# Patient Record
Sex: MALE | Race: ASIAN | NOT HISPANIC OR LATINO
[De-identification: names, ages, dates, MRNs, and addresses within clinical notes are randomized per-mention and may not be internally consistent; named-entity substitution may affect disease eponyms.]

---

## 2021-01-04 ENCOUNTER — APPOINTMENT (OUTPATIENT)
Dept: UROLOGY | Facility: CLINIC | Age: 66
End: 2021-01-04
Payer: COMMERCIAL

## 2021-01-04 VITALS
HEART RATE: 96 BPM | BODY MASS INDEX: 29.57 KG/M2 | SYSTOLIC BLOOD PRESSURE: 157 MMHG | WEIGHT: 184 LBS | DIASTOLIC BLOOD PRESSURE: 82 MMHG | HEIGHT: 66 IN | TEMPERATURE: 98.6 F

## 2021-01-04 DIAGNOSIS — Z78.9 OTHER SPECIFIED HEALTH STATUS: ICD-10-CM

## 2021-01-04 PROCEDURE — 99072 ADDL SUPL MATRL&STAF TM PHE: CPT

## 2021-01-04 PROCEDURE — 99204 OFFICE O/P NEW MOD 45 MIN: CPT | Mod: 25

## 2021-01-04 PROCEDURE — 51798 US URINE CAPACITY MEASURE: CPT

## 2021-01-04 RX ORDER — LOSARTAN POTASSIUM 100 MG/1
TABLET, FILM COATED ORAL
Refills: 0 | Status: ACTIVE | COMMUNITY

## 2021-01-04 RX ORDER — AMLODIPINE BESYLATE 5 MG/1
TABLET ORAL
Refills: 0 | Status: ACTIVE | COMMUNITY

## 2021-01-04 RX ORDER — FINASTERIDE 5 MG/1
5 TABLET, FILM COATED ORAL DAILY
Qty: 90 | Refills: 3 | Status: ACTIVE | COMMUNITY
Start: 2021-01-04 | End: 1900-01-01

## 2021-01-04 NOTE — ADDENDUM
[FreeTextEntry1] : Entered by Karthikeyan Emmanuel, acting as scribe for Dr. Fernando Muhammad.\par \par The documentation recorded by the scribe accurately reflects the service I personally performed and the decisions made by me.\par

## 2021-01-04 NOTE — LETTER BODY
[FreeTextEntry1] : Gavin Ayala MD\par [address] [facility],\[place] [address]\par [phone]\par \par Dear Dr. Ayala,\par \par Reason for Visit: BPH. Nocturnal enuresis. Stress incontinence.\par \par This is a 65 year-old gentleman with symptoms of BPH. Patient is here today for evaluation. Patient reports he has weak uroflow, frequency, and hesitancy. He denies any hematuria. He also complains of nocturnal enuresis and stress incontinence. His symptoms are aggravated by hydration. He denies any alleviating factors. He has not tried any medical therapy previously. He reports no pain. All other review of systems are negative. He has no cancer in his family medical history. He has no previous surgical history. Past medical history, family history and social history were inquired and were noncontributory to current condition. The patient does not use tobacco or drink alcohol. Medications and allergies were reviewed. He has no known allergies to medication. \par \par On examination, the patient is a healthy-appearing gentleman in no acute distress. He is alert and oriented follows commands. He has normal mood and affect. He is normocephalic. Neck is supple. Oral no thrush Respirations are unlabored. His abdomen is soft and nontender. Bladder is nonpalpable. No CVA tenderness. Neurologically he is grossly intact. No peripheral edema. Skin without gross abnormality. He has normal male external genitalia. Normal meatus. Bilateral testes are descended intrascrotally and normal to palpation. On rectal examination, there is normal sphincter tone. The prostate is clinically benign without focal induration or nodularity.\par \par Post-void residual on bladder scan today was 750 cc.\par \par ASSESSMENT: BPH. Nocturnal enuresis. Stress incontinence. Urinary retention.\par \par I counseled the patient on the various etiology of his symptoms. I discussed the natural history of BPH and the treatment options available. I discussed the options of conservative management with fluid in dietary restrictions, herbal therapy, medical therapy, and minimally invasive procedures. Risk and benefits were discussed. I answered his questions. I recommended he begin a trial of Flomax and Proscar. I discussed the potential side effects of the medications. I counseled the patient on its use and side effects. If the patient develops any side effects, the patient will discontinue the medications and contact me. He will obtain BMP, PSA and urinalysis today for further evaluation. His PVR today was 750 cc. Given his urinary retention, I recommended the patient undergo Madden catheter placement today. He declines catheter placement. I also recommended he obtain renal ultrasound for further evaluation. Risks and alternatives were discussed. I answered the patient questions. The patient will follow-up in 1 week and will contact me with any questions or concerns. Thank you for the opportunity to participate in the care of Mr. LENZ. I will keep you updated on his progress.\par \par Plan: Trial of Flomax and Proscar. Renal ultrasound. BMP. PSA. Urinalysis. Follow-up in 1 week.

## 2021-01-10 LAB
ANION GAP SERPL CALC-SCNC: 12 MMOL/L
BUN SERPL-MCNC: 13 MG/DL
CALCIUM SERPL-MCNC: 9.5 MG/DL
CHLORIDE SERPL-SCNC: 101 MMOL/L
CO2 SERPL-SCNC: 27 MMOL/L
CREAT SERPL-MCNC: 1 MG/DL
GLUCOSE SERPL-MCNC: 98 MG/DL
POTASSIUM SERPL-SCNC: 3.7 MMOL/L
PSA FREE FLD-MCNC: 22 %
PSA FREE SERPL-MCNC: 1.39 NG/ML
PSA SERPL-MCNC: 6.33 NG/ML
SODIUM SERPL-SCNC: 139 MMOL/L

## 2021-01-14 ENCOUNTER — OUTPATIENT (OUTPATIENT)
Dept: OUTPATIENT SERVICES | Facility: HOSPITAL | Age: 66
LOS: 1 days | End: 2021-01-14
Payer: COMMERCIAL

## 2021-01-14 ENCOUNTER — APPOINTMENT (OUTPATIENT)
Dept: UROLOGY | Facility: CLINIC | Age: 66
End: 2021-01-14
Payer: COMMERCIAL

## 2021-01-14 DIAGNOSIS — Z00.00 ENCOUNTER FOR GENERAL ADULT MEDICAL EXAMINATION W/OUT ABNORMAL FINDINGS: ICD-10-CM

## 2021-01-14 DIAGNOSIS — R35.0 FREQUENCY OF MICTURITION: ICD-10-CM

## 2021-01-14 PROCEDURE — 76775 US EXAM ABDO BACK WALL LIM: CPT | Mod: 26

## 2021-01-14 PROCEDURE — 99072 ADDL SUPL MATRL&STAF TM PHE: CPT

## 2021-01-14 PROCEDURE — 51798 US URINE CAPACITY MEASURE: CPT

## 2021-01-14 PROCEDURE — 76775 US EXAM ABDO BACK WALL LIM: CPT

## 2021-01-14 PROCEDURE — 99214 OFFICE O/P EST MOD 30 MIN: CPT | Mod: 25

## 2021-01-14 RX ORDER — ENEMA 19; 7 G/133ML; G/133ML
7-19 ENEMA RECTAL
Qty: 2 | Refills: 0 | Status: ACTIVE | COMMUNITY
Start: 2021-01-14 | End: 1900-01-01

## 2021-01-14 NOTE — LETTER BODY
[FreeTextEntry1] : Gavin Ayala MD\par [address] [facility],\[place] [address]\par [phone]\par \par Dear Dr. Ayala,\par \par Reason for Visit: BPH. Nocturnal enuresis. Stress incontinence. Urinary retention. Elevated PSA.\par \par This is a 65 year-old gentleman with symptoms of BPH. The patient returns today for follow-up renal ultrasound. Since his last visit, the patient reports taking Flomax BID and Proscar regularly without any side effects or difficulties with the medications. He reports of improved urinary symptoms on medical therapy. He denies any hematuria or pain. All other review of systems are negative. Past medical history, family history and social history were unchanged. Medications and allergies were reviewed. He has no known allergies to medication. \par \par On examination, the patient is a healthy-appearing gentleman in no acute distress. He is alert and oriented follows commands. He has normal mood and affect. He is normocephalic. Neck is supple. Oral no thrush Respirations are unlabored. His abdomen is soft and nontender. Bladder is nonpalpable. No CVA tenderness. Neurologically he is grossly intact. No peripheral edema. Skin without gross abnormality. He has normal male external genitalia. Normal meatus. Bilateral testes are descended intrascrotally and normal to palpation. On rectal examination, there is normal sphincter tone. The prostate is clinically benign without focal induration or nodularity.\par \par His BMP demonstrated normal renal functions, creatinine 1. His PSA was 6.33, which is elevated.\par \par I personally reviewed ultrasound images with the patient today and images demonstrated a 0.6 x 0.4 cm mid pole cortical simple cyst seen in the left kidney with no vascularity. The right kidney appears unremarkable. Both kidneys are normal in size and echogenicity without stones, hydronephrosis or solid masses visualized\par \par Post-void residual on bladder scan today was 400 cc, which is improved. On bladder scan today, a large median lobe versus bladder lesion was also visualized. His previous PVR was 750 cc.\par \par ASSESSMENT: BPH, improved on Flomax and Proscar. Nocturnal enuresis. Stress incontinence. Urinary retention, improved. Elevated PSA. Abnormal bladder scan.\par \par I counseled the patient. In terms of his urinary retention, his PVR today was 400 cc, which is improved. Given his abnormal bladder scan, I recommended the patient undergo cystoscopy to evaluate for bladder lesion versus large median lobe. In terms of his BPH, he has improved symptoms on medical therapy. I recommended he continue taking Flomax and Proscar regularly as directed. In terms of his elevated PSA, I recommended the patient undergo prostate MRI. I counseled the patient regarding the procedure. The risks and benefits were discussed. Alternatives were given. I answered the patient questions. The patient will take the necessary preparations for the procedure, including fleet enema. I recommended he repeat BMP and PSA today to ensure stability. He will also obtain urinalysis today. Risks and alternatives were discussed. I answered the patient questions. The patient will follow-up as directed and will contact me with any questions or concerns. Thank you for the opportunity to participate in the care of Ms. LENZ. I will keep you updated on her progress.\par \par Plan: Continue Flomax and Proscar. Cystoscopy. Prostate MRI. Fleet enema. BMP. PSA. Urinalysis. Follow-up as directed.

## 2021-01-15 DIAGNOSIS — N39.3 STRESS INCONTINENCE (FEMALE) (MALE): ICD-10-CM

## 2021-01-15 DIAGNOSIS — R33.9 RETENTION OF URINE, UNSPECIFIED: ICD-10-CM

## 2021-02-11 ENCOUNTER — OUTPATIENT (OUTPATIENT)
Dept: OUTPATIENT SERVICES | Facility: HOSPITAL | Age: 66
LOS: 1 days | End: 2021-02-11
Payer: COMMERCIAL

## 2021-02-11 ENCOUNTER — APPOINTMENT (OUTPATIENT)
Dept: UROLOGY | Facility: CLINIC | Age: 66
End: 2021-02-11
Payer: COMMERCIAL

## 2021-02-11 VITALS
DIASTOLIC BLOOD PRESSURE: 104 MMHG | RESPIRATION RATE: 16 BRPM | HEART RATE: 78 BPM | SYSTOLIC BLOOD PRESSURE: 158 MMHG | TEMPERATURE: 97.9 F

## 2021-02-11 DIAGNOSIS — R35.0 FREQUENCY OF MICTURITION: ICD-10-CM

## 2021-02-11 DIAGNOSIS — N39.44 NOCTURNAL ENURESIS: ICD-10-CM

## 2021-02-11 PROCEDURE — 88112 CYTOPATH CELL ENHANCE TECH: CPT | Mod: 26

## 2021-02-11 PROCEDURE — 52000 CYSTOURETHROSCOPY: CPT

## 2021-02-11 PROCEDURE — 99213 OFFICE O/P EST LOW 20 MIN: CPT | Mod: 25

## 2021-02-11 PROCEDURE — 99072 ADDL SUPL MATRL&STAF TM PHE: CPT

## 2021-02-11 RX ORDER — TAMSULOSIN HYDROCHLORIDE 0.4 MG/1
0.4 CAPSULE ORAL
Qty: 180 | Refills: 3 | Status: ACTIVE | COMMUNITY
Start: 2021-01-04 | End: 1900-01-01

## 2021-02-11 NOTE — LETTER BODY
[FreeTextEntry1] : Gavin Ayala MD\par [address] [facility],\[place] [address]\par [phone]\par \par Dear Dr. Ayala,\par \par Reason for Visit: BPH. Nocturnal enuresis. Stress incontinence. Urinary retention. Elevated PSA.\par \par This is a 65 year-old gentleman with symptoms of BPH. His renal ultrasound in January demonstrated a simple left renal cyst, but was otherwise unremarkable. He returns today for follow-up and cystoscopy. Since he was last seen, the patient reports taking Flomax QD and Proscar regularly without any side effects or difficulties. He reports of stable urinary symptoms on medical therapy. He denies any hematuria or pain. He denies any changes in health. All other review of systems are negative. Past medical history, family history and social history were unchanged. Medications and allergies were reviewed. He has no known allergies to medication. \par \par On examination, the patient is a healthy-appearing gentleman in no acute distress. He is alert and oriented follows commands. He has normal mood and affect. He is normocephalic. Neck is supple. Oral no thrush Respirations are unlabored. His abdomen is soft and nontender. Bladder is nonpalpable. No CVA tenderness. Neurologically he is grossly intact. No peripheral edema. Skin without gross abnormality. He has normal male external genitalia. Normal meatus. Bilateral testes are descended intrascrotally and normal to palpation. On rectal examination, there is normal sphincter tone. The prostate is clinically benign without focal induration or nodularity.\par \par His cystoscopy today demonstrated a 6 cm prostatic urethra and trilobar hypertrophy obstructing the urinary outlet.\par \par ASSESSMENT: BPH, stable on Flomax and Proscar. Nocturnal enuresis. Stress incontinence. Urinary retention, improved. Elevated PSA. Abnormal bladder scan.\par \par I counseled the patient. His cystoscopy today demonstrated trilobar hypertrophy obstructing the urinary outlet. I recommended the patient consider GreenLight laser vaporization of prostate. The patient declined surgery currently. He wishes to proceed with medical therapy. I recommended the patient increase taking Flomax to BID and continue taking Proscar. I renewed the patient's prescription for Flomax BID today. I encouraged the patient to continue medications regularly as directed. In terms of his elevated PSA, I recommended he undergo prostate MRI. I counseled the patient regarding the procedure. The risks and benefits were discussed. Alternatives were given. I answered the patient questions. The patient will take the necessary preparations for the procedure, including fleet enema. Risks and alternatives were discussed. I answered the patient questions. The patient will follow-up in 2 weeks and will contact me with any questions or concerns. Thank you for the opportunity to participate in the care of Ms. LENZ. I will keep you updated on her progress.\par \par Plan: Continue Proscar. Increase Flomax to BID. Prostate MRI. Fleet enema. Follow-up in 2 weeks.

## 2021-02-14 ENCOUNTER — APPOINTMENT (OUTPATIENT)
Dept: MRI IMAGING | Facility: IMAGING CENTER | Age: 66
End: 2021-02-14
Payer: COMMERCIAL

## 2021-02-14 ENCOUNTER — OUTPATIENT (OUTPATIENT)
Dept: OUTPATIENT SERVICES | Facility: HOSPITAL | Age: 66
LOS: 1 days | End: 2021-02-14
Payer: COMMERCIAL

## 2021-02-14 ENCOUNTER — RESULT REVIEW (OUTPATIENT)
Age: 66
End: 2021-02-14

## 2021-02-14 DIAGNOSIS — R33.9 RETENTION OF URINE, UNSPECIFIED: ICD-10-CM

## 2021-02-14 DIAGNOSIS — N39.3 STRESS INCONTINENCE (FEMALE) (MALE): ICD-10-CM

## 2021-02-14 DIAGNOSIS — R97.20 ELEVATED PROSTATE SPECIFIC ANTIGEN [PSA]: ICD-10-CM

## 2021-02-14 DIAGNOSIS — Z00.8 ENCOUNTER FOR OTHER GENERAL EXAMINATION: ICD-10-CM

## 2021-02-14 PROCEDURE — 76377 3D RENDER W/INTRP POSTPROCES: CPT | Mod: 26

## 2021-02-14 PROCEDURE — 72197 MRI PELVIS W/O & W/DYE: CPT

## 2021-02-14 PROCEDURE — 72197 MRI PELVIS W/O & W/DYE: CPT | Mod: 26

## 2021-02-14 PROCEDURE — A9585: CPT

## 2021-02-14 PROCEDURE — 76377 3D RENDER W/INTRP POSTPROCES: CPT

## 2021-02-15 LAB — URINE CYTOLOGY: NORMAL

## 2021-02-17 DIAGNOSIS — N39.3 STRESS INCONTINENCE (FEMALE) (MALE): ICD-10-CM

## 2021-02-17 DIAGNOSIS — N39.44 NOCTURNAL ENURESIS: ICD-10-CM

## 2021-03-01 ENCOUNTER — APPOINTMENT (OUTPATIENT)
Dept: UROLOGY | Facility: CLINIC | Age: 66
End: 2021-03-01
Payer: COMMERCIAL

## 2021-03-01 PROCEDURE — 99072 ADDL SUPL MATRL&STAF TM PHE: CPT

## 2021-03-01 PROCEDURE — 99214 OFFICE O/P EST MOD 30 MIN: CPT | Mod: 25

## 2021-03-01 PROCEDURE — 51798 US URINE CAPACITY MEASURE: CPT

## 2021-03-01 NOTE — LETTER BODY
[FreeTextEntry1] : Gavin Ayala MD\par [address] [facility],\[place] [address]\par [phone]\par \par Dear Dr. Ayala,\par \par Reason for Visit: BPH. Nocturnal enuresis. Stress incontinence. Urinary retention. Elevated PSA.\par \par This is a 65 year-old gentleman with symptoms of BPH. His renal ultrasound in January 2020 demonstrated a simple left renal cyst, but was otherwise unremarkable. Patient's cystoscopy in February demonstrated a 6 cm prostatic urethra and trilobar hypertrophy obstructing the urinary outlet. The patient returns today for follow-up. Since his last visit, the patient obtained a prostate MRI in February, which demonstrated an enlarged prostate and an intermediate prostatic lesion, PI-RADS 3. Patient reports taking Flomax BID and Proscar regularly without any side effects or difficulties. He reports of stable urinary symptoms on medical therapy. He denies any hematuria or pain. He denies any changes in health. All other review of systems are negative. Past medical history, family history and social history were unchanged. Medications and allergies were reviewed. He has no known allergies to medication. \par \par On examination, the patient is a healthy-appearing gentleman in no acute distress. He is alert and oriented follows commands. He has normal mood and affect. He is normocephalic. Neck is supple. Oral no thrush Respirations are unlabored. His abdomen is soft and nontender. Bladder is nonpalpable. No CVA tenderness. Neurologically he is grossly intact. No peripheral edema. Skin without gross abnormality. He has normal male external genitalia. Normal meatus. Bilateral testes are descended intrascrotally and normal to palpation. On rectal examination, there is normal sphincter tone. The prostate is clinically benign without focal induration or nodularity.\par \par I personally reviewed MRI prostate images with the patient today and images demonstrated an enlarged prostate gland of 147 cc, and an intermediate prostatic lesion, PI-RADS 3.\par \par Post-void residual on bladder scan today was 700 cc.\par \par ASSESSMENT: BPH, stable on Flomax BID and Proscar. Nocturnal enuresis. Stress incontinence. Urinary retention, improved. Elevated PSA. Abnormal bladder scan. \par \par I counseled the patient. His PVR today was 700 cc. In terms of his BPH, his symptoms are stable on medical therapy. I recommended he continue taking Flomax BID and Proscar. In terms of his elevated PSA, his prostate MRI demonstrated an enlarged prostate and an intermediate prostatic lesion, PI-RADS 3. I recommended the patient consider undergoing either open or robotic assisted simple prostatectomy. I counseled the patient regarding the procedure. The risks and benefits were discussed. Alternatives were given. I answered the patient questions. The patient will consider the procedure. Risks and alternatives were discussed. I answered the patient questions. The patient will follow-up as directed and will contact me with any questions or concerns. Thank you for the opportunity to participate in the care of Mr. LENZ. I will keep you updated on his progress.\par \par Plan: Continue Flomax BID and Proscar. Consider surgery. Follow-up as directed.

## 2021-03-31 ENCOUNTER — OUTPATIENT (OUTPATIENT)
Dept: OUTPATIENT SERVICES | Facility: HOSPITAL | Age: 66
LOS: 1 days | End: 2021-03-31
Payer: COMMERCIAL

## 2021-03-31 VITALS
DIASTOLIC BLOOD PRESSURE: 90 MMHG | WEIGHT: 188.05 LBS | TEMPERATURE: 97 F | HEIGHT: 65.5 IN | SYSTOLIC BLOOD PRESSURE: 149 MMHG | RESPIRATION RATE: 15 BRPM | HEART RATE: 71 BPM | OXYGEN SATURATION: 97 %

## 2021-03-31 DIAGNOSIS — Z98.890 OTHER SPECIFIED POSTPROCEDURAL STATES: Chronic | ICD-10-CM

## 2021-03-31 DIAGNOSIS — N39.3 STRESS INCONTINENCE (FEMALE) (MALE): ICD-10-CM

## 2021-03-31 DIAGNOSIS — Z01.818 ENCOUNTER FOR OTHER PREPROCEDURAL EXAMINATION: ICD-10-CM

## 2021-03-31 LAB
A1C WITH ESTIMATED AVERAGE GLUCOSE RESULT: 5.5 % — SIGNIFICANT CHANGE UP (ref 4–5.6)
ANION GAP SERPL CALC-SCNC: 10 MMOL/L — SIGNIFICANT CHANGE UP (ref 5–17)
BUN SERPL-MCNC: 17 MG/DL — SIGNIFICANT CHANGE UP (ref 7–23)
CALCIUM SERPL-MCNC: 9.4 MG/DL — SIGNIFICANT CHANGE UP (ref 8.4–10.5)
CHLORIDE SERPL-SCNC: 100 MMOL/L — SIGNIFICANT CHANGE UP (ref 96–108)
CO2 SERPL-SCNC: 26 MMOL/L — SIGNIFICANT CHANGE UP (ref 22–31)
CREAT SERPL-MCNC: 0.91 MG/DL — SIGNIFICANT CHANGE UP (ref 0.5–1.3)
ESTIMATED AVERAGE GLUCOSE: 111 MG/DL — SIGNIFICANT CHANGE UP (ref 68–114)
GLUCOSE SERPL-MCNC: 98 MG/DL — SIGNIFICANT CHANGE UP (ref 70–99)
HCT VFR BLD CALC: 46.6 % — SIGNIFICANT CHANGE UP (ref 39–50)
HGB BLD-MCNC: 15 G/DL — SIGNIFICANT CHANGE UP (ref 13–17)
MCHC RBC-ENTMCNC: 29.5 PG — SIGNIFICANT CHANGE UP (ref 27–34)
MCHC RBC-ENTMCNC: 32.2 GM/DL — SIGNIFICANT CHANGE UP (ref 32–36)
MCV RBC AUTO: 91.7 FL — SIGNIFICANT CHANGE UP (ref 80–100)
NRBC # BLD: 0 /100 WBCS — SIGNIFICANT CHANGE UP (ref 0–0)
PLATELET # BLD AUTO: 259 K/UL — SIGNIFICANT CHANGE UP (ref 150–400)
POTASSIUM SERPL-MCNC: 4.3 MMOL/L — SIGNIFICANT CHANGE UP (ref 3.5–5.3)
POTASSIUM SERPL-SCNC: 4.3 MMOL/L — SIGNIFICANT CHANGE UP (ref 3.5–5.3)
RBC # BLD: 5.08 M/UL — SIGNIFICANT CHANGE UP (ref 4.2–5.8)
RBC # FLD: 12.5 % — SIGNIFICANT CHANGE UP (ref 10.3–14.5)
SODIUM SERPL-SCNC: 136 MMOL/L — SIGNIFICANT CHANGE UP (ref 135–145)
WBC # BLD: 7.52 K/UL — SIGNIFICANT CHANGE UP (ref 3.8–10.5)
WBC # FLD AUTO: 7.52 K/UL — SIGNIFICANT CHANGE UP (ref 3.8–10.5)

## 2021-03-31 PROCEDURE — G0463: CPT

## 2021-03-31 NOTE — H&P PST ADULT - HISTORY OF PRESENT ILLNESS
66 YO M PMH HTN, prediabetes, seasonal allergies, BPH, cystoscopy (2/2021) demonstrated 6cm prostatic urethra & trilobar hypertrophy obstructing the urinary outlet, MRI demonstrated enlarged prostate w/intermediate prostatic lesion, c/o urinary incontinence & frequency, presents to PST for SUPRAPUBIC PROSTATECTOMY 4/5/2021. Denies any hematuria, pain, palpitations, SOB, N/V, Covid symptoms (fever or chills) or exposure.    ***Covid test scheduled for 4/2/2021 at [facility].

## 2021-03-31 NOTE — H&P PST ADULT - ATTENDING COMMENTS
Patient with BPH with persistent urinary retention despite medcial therapy. Patient wishes and specifically requested open simple prostatectomy. Informed consent was obtained. Alternatives were given. Risks including but not limited to bleeding, need for blood transfusion, infection, risk of anesthesia, pain, failure to cure, incontinence, impotence, rectal injury, bladder neck contracture, need for future procedure, and injury to surrounding structures were discussed. Patient wishes to proceed with procedure. Patient with BPH with persistent urinary retention with PVR > 500 cc despite medical therapy. Patient wishes and specifically requested open simple prostatectomy. Informed consent was obtained. Alternatives were given. Risks including but not limited to bleeding, need for blood transfusion, infection, risk of anesthesia, pain, failure to cure, incontinence, impotence, rectal injury, bladder neck contracture, need for future procedure, and injury to surrounding structures were discussed. Patient wishes to proceed with procedure.

## 2021-03-31 NOTE — H&P PST ADULT - NSICDXPASTMEDICALHX_GEN_ALL_CORE_FT
PAST MEDICAL HISTORY:  Gout     H/O urinary incontinence     H/O: HTN (hypertension)     History of prediabetes     Seasonal allergies      PAST MEDICAL HISTORY:  Gout     H/O urinary incontinence     H/O: HTN (hypertension)     History of BPH     History of prediabetes     Seasonal allergies

## 2021-03-31 NOTE — H&P PST ADULT - NSICDXPROBLEM_GEN_ALL_CORE_FT
PROBLEM DIAGNOSES  Problem: Stress incontinence (female) (male)  Assessment and Plan: SUPRAPUBIC PROSTATECTOMY  Pre-op education provided - all questions answered   Chlorhex soap & insructions given  CBC, BMP, Ucx, T&S, HA1C sent in PST  Continue losartan & amlodipine DOS w/small sips of water  FS DOS

## 2021-03-31 NOTE — H&P PST ADULT - NSANTHOSAYNRD_GEN_A_CORE
No. MAI screening performed.  STOP BANG Legend: 0-2 = LOW Risk; 3-4 = INTERMEDIATE Risk; 5-8 = HIGH Risk

## 2021-04-01 LAB
CULTURE RESULTS: NO GROWTH — SIGNIFICANT CHANGE UP
SPECIMEN SOURCE: SIGNIFICANT CHANGE UP

## 2021-04-02 ENCOUNTER — OUTPATIENT (OUTPATIENT)
Dept: OUTPATIENT SERVICES | Facility: HOSPITAL | Age: 66
LOS: 1 days | End: 2021-04-02
Payer: COMMERCIAL

## 2021-04-02 DIAGNOSIS — Z11.52 ENCOUNTER FOR SCREENING FOR COVID-19: ICD-10-CM

## 2021-04-02 DIAGNOSIS — Z98.890 OTHER SPECIFIED POSTPROCEDURAL STATES: Chronic | ICD-10-CM

## 2021-04-02 PROBLEM — Z87.898 PERSONAL HISTORY OF OTHER SPECIFIED CONDITIONS: Chronic | Status: ACTIVE | Noted: 2021-03-31

## 2021-04-02 PROBLEM — J30.2 OTHER SEASONAL ALLERGIC RHINITIS: Chronic | Status: ACTIVE | Noted: 2021-03-31

## 2021-04-02 PROBLEM — Z87.438 PERSONAL HISTORY OF OTHER DISEASES OF MALE GENITAL ORGANS: Chronic | Status: ACTIVE | Noted: 2021-03-31

## 2021-04-02 PROBLEM — Z86.79 PERSONAL HISTORY OF OTHER DISEASES OF THE CIRCULATORY SYSTEM: Chronic | Status: ACTIVE | Noted: 2021-03-31

## 2021-04-02 PROBLEM — M10.9 GOUT, UNSPECIFIED: Chronic | Status: ACTIVE | Noted: 2021-03-31

## 2021-04-02 LAB — SARS-COV-2 RNA SPEC QL NAA+PROBE: SIGNIFICANT CHANGE UP

## 2021-04-02 PROCEDURE — C9803: CPT

## 2021-04-02 PROCEDURE — U0003: CPT

## 2021-04-02 PROCEDURE — U0005: CPT

## 2021-04-04 ENCOUNTER — TRANSCRIPTION ENCOUNTER (OUTPATIENT)
Age: 66
End: 2021-04-04

## 2021-04-05 ENCOUNTER — APPOINTMENT (OUTPATIENT)
Dept: UROLOGY | Facility: HOSPITAL | Age: 66
End: 2021-04-05

## 2021-04-05 ENCOUNTER — RESULT REVIEW (OUTPATIENT)
Age: 66
End: 2021-04-05

## 2021-04-05 ENCOUNTER — INPATIENT (INPATIENT)
Facility: HOSPITAL | Age: 66
LOS: 7 days | Discharge: HOME HEALTH | DRG: 707 | End: 2021-04-13
Attending: UROLOGY | Admitting: UROLOGY
Payer: COMMERCIAL

## 2021-04-05 VITALS
TEMPERATURE: 98 F | OXYGEN SATURATION: 97 % | SYSTOLIC BLOOD PRESSURE: 141 MMHG | DIASTOLIC BLOOD PRESSURE: 94 MMHG | HEIGHT: 65.5 IN | WEIGHT: 188.05 LBS | RESPIRATION RATE: 16 BRPM | HEART RATE: 75 BPM

## 2021-04-05 DIAGNOSIS — N39.3 STRESS INCONTINENCE (FEMALE) (MALE): ICD-10-CM

## 2021-04-05 DIAGNOSIS — Z98.890 OTHER SPECIFIED POSTPROCEDURAL STATES: Chronic | ICD-10-CM

## 2021-04-05 LAB
ANION GAP SERPL CALC-SCNC: 9 MMOL/L — SIGNIFICANT CHANGE UP (ref 5–17)
BASOPHILS # BLD AUTO: 0.05 K/UL — SIGNIFICANT CHANGE UP (ref 0–0.2)
BASOPHILS NFR BLD AUTO: 0.4 % — SIGNIFICANT CHANGE UP (ref 0–2)
BUN SERPL-MCNC: 13 MG/DL — SIGNIFICANT CHANGE UP (ref 7–23)
CALCIUM SERPL-MCNC: 8 MG/DL — LOW (ref 8.4–10.5)
CHLORIDE SERPL-SCNC: 102 MMOL/L — SIGNIFICANT CHANGE UP (ref 96–108)
CO2 SERPL-SCNC: 25 MMOL/L — SIGNIFICANT CHANGE UP (ref 22–31)
CREAT SERPL-MCNC: 0.91 MG/DL — SIGNIFICANT CHANGE UP (ref 0.5–1.3)
EOSINOPHIL # BLD AUTO: 0.12 K/UL — SIGNIFICANT CHANGE UP (ref 0–0.5)
EOSINOPHIL NFR BLD AUTO: 1 % — SIGNIFICANT CHANGE UP (ref 0–6)
GLUCOSE SERPL-MCNC: 162 MG/DL — HIGH (ref 70–99)
HCT VFR BLD CALC: 39.9 % — SIGNIFICANT CHANGE UP (ref 39–50)
HCT VFR BLD CALC: 42.4 % — SIGNIFICANT CHANGE UP (ref 39–50)
HGB BLD-MCNC: 12.8 G/DL — LOW (ref 13–17)
HGB BLD-MCNC: 13.6 G/DL — SIGNIFICANT CHANGE UP (ref 13–17)
IMM GRANULOCYTES NFR BLD AUTO: 0.3 % — SIGNIFICANT CHANGE UP (ref 0–1.5)
LYMPHOCYTES # BLD AUTO: 1.67 K/UL — SIGNIFICANT CHANGE UP (ref 1–3.3)
LYMPHOCYTES # BLD AUTO: 13.3 % — SIGNIFICANT CHANGE UP (ref 13–44)
MCHC RBC-ENTMCNC: 29.3 PG — SIGNIFICANT CHANGE UP (ref 27–34)
MCHC RBC-ENTMCNC: 29.6 PG — SIGNIFICANT CHANGE UP (ref 27–34)
MCHC RBC-ENTMCNC: 32.1 GM/DL — SIGNIFICANT CHANGE UP (ref 32–36)
MCHC RBC-ENTMCNC: 32.1 GM/DL — SIGNIFICANT CHANGE UP (ref 32–36)
MCV RBC AUTO: 91.3 FL — SIGNIFICANT CHANGE UP (ref 80–100)
MCV RBC AUTO: 92.2 FL — SIGNIFICANT CHANGE UP (ref 80–100)
MONOCYTES # BLD AUTO: 0.32 K/UL — SIGNIFICANT CHANGE UP (ref 0–0.9)
MONOCYTES NFR BLD AUTO: 2.5 % — SIGNIFICANT CHANGE UP (ref 2–14)
NEUTROPHILS # BLD AUTO: 10.39 K/UL — HIGH (ref 1.8–7.4)
NEUTROPHILS NFR BLD AUTO: 82.5 % — HIGH (ref 43–77)
NRBC # BLD: 0 /100 WBCS — SIGNIFICANT CHANGE UP (ref 0–0)
NRBC # BLD: 0 /100 WBCS — SIGNIFICANT CHANGE UP (ref 0–0)
PLATELET # BLD AUTO: 191 K/UL — SIGNIFICANT CHANGE UP (ref 150–400)
PLATELET # BLD AUTO: 223 K/UL — SIGNIFICANT CHANGE UP (ref 150–400)
POTASSIUM SERPL-MCNC: 3.6 MMOL/L — SIGNIFICANT CHANGE UP (ref 3.5–5.3)
POTASSIUM SERPL-SCNC: 3.6 MMOL/L — SIGNIFICANT CHANGE UP (ref 3.5–5.3)
RBC # BLD: 4.37 M/UL — SIGNIFICANT CHANGE UP (ref 4.2–5.8)
RBC # BLD: 4.6 M/UL — SIGNIFICANT CHANGE UP (ref 4.2–5.8)
RBC # FLD: 12.2 % — SIGNIFICANT CHANGE UP (ref 10.3–14.5)
RBC # FLD: 12.3 % — SIGNIFICANT CHANGE UP (ref 10.3–14.5)
RH IG SCN BLD-IMP: POSITIVE — SIGNIFICANT CHANGE UP
SODIUM SERPL-SCNC: 136 MMOL/L — SIGNIFICANT CHANGE UP (ref 135–145)
WBC # BLD: 12.59 K/UL — HIGH (ref 3.8–10.5)
WBC # BLD: 19.43 K/UL — HIGH (ref 3.8–10.5)
WBC # FLD AUTO: 12.59 K/UL — HIGH (ref 3.8–10.5)
WBC # FLD AUTO: 19.43 K/UL — HIGH (ref 3.8–10.5)

## 2021-04-05 PROCEDURE — 55821 REMOVAL OF PROSTATE: CPT

## 2021-04-05 PROCEDURE — 88341 IMHCHEM/IMCYTCHM EA ADD ANTB: CPT | Mod: 26

## 2021-04-05 PROCEDURE — 88307 TISSUE EXAM BY PATHOLOGIST: CPT | Mod: 26

## 2021-04-05 PROCEDURE — 88342 IMHCHEM/IMCYTCHM 1ST ANTB: CPT | Mod: 26

## 2021-04-05 PROCEDURE — 99231 SBSQ HOSP IP/OBS SF/LOW 25: CPT

## 2021-04-05 RX ORDER — AMLODIPINE BESYLATE 2.5 MG/1
1 TABLET ORAL
Qty: 0 | Refills: 0 | DISCHARGE

## 2021-04-05 RX ORDER — HYDROMORPHONE HYDROCHLORIDE 2 MG/ML
0.5 INJECTION INTRAMUSCULAR; INTRAVENOUS; SUBCUTANEOUS
Refills: 0 | Status: DISCONTINUED | OUTPATIENT
Start: 2021-04-05 | End: 2021-04-05

## 2021-04-05 RX ORDER — SODIUM CHLORIDE 9 MG/ML
1000 INJECTION, SOLUTION INTRAVENOUS
Refills: 0 | Status: DISCONTINUED | OUTPATIENT
Start: 2021-04-05 | End: 2021-04-05

## 2021-04-05 RX ORDER — HEPARIN SODIUM 5000 [USP'U]/ML
5000 INJECTION INTRAVENOUS; SUBCUTANEOUS EVERY 8 HOURS
Refills: 0 | Status: DISCONTINUED | OUTPATIENT
Start: 2021-04-05 | End: 2021-04-09

## 2021-04-05 RX ORDER — SENNA PLUS 8.6 MG/1
2 TABLET ORAL AT BEDTIME
Refills: 0 | Status: DISCONTINUED | OUTPATIENT
Start: 2021-04-05 | End: 2021-04-09

## 2021-04-05 RX ORDER — OXYCODONE AND ACETAMINOPHEN 5; 325 MG/1; MG/1
1 TABLET ORAL EVERY 4 HOURS
Refills: 0 | Status: DISCONTINUED | OUTPATIENT
Start: 2021-04-05 | End: 2021-04-09

## 2021-04-05 RX ORDER — KETOROLAC TROMETHAMINE 30 MG/ML
15 SYRINGE (ML) INJECTION EVERY 8 HOURS
Refills: 0 | Status: DISCONTINUED | OUTPATIENT
Start: 2021-04-05 | End: 2021-04-06

## 2021-04-05 RX ORDER — MORPHINE SULFATE 50 MG/1
4 CAPSULE, EXTENDED RELEASE ORAL
Refills: 0 | Status: DISCONTINUED | OUTPATIENT
Start: 2021-04-05 | End: 2021-04-09

## 2021-04-05 RX ORDER — LOSARTAN POTASSIUM 100 MG/1
25 TABLET, FILM COATED ORAL DAILY
Refills: 0 | Status: DISCONTINUED | OUTPATIENT
Start: 2021-04-05 | End: 2021-04-06

## 2021-04-05 RX ORDER — ONDANSETRON 8 MG/1
4 TABLET, FILM COATED ORAL EVERY 6 HOURS
Refills: 0 | Status: DISCONTINUED | OUTPATIENT
Start: 2021-04-05 | End: 2021-04-09

## 2021-04-05 RX ORDER — OXYCODONE AND ACETAMINOPHEN 5; 325 MG/1; MG/1
2 TABLET ORAL EVERY 6 HOURS
Refills: 0 | Status: DISCONTINUED | OUTPATIENT
Start: 2021-04-05 | End: 2021-04-09

## 2021-04-05 RX ORDER — LOSARTAN POTASSIUM 100 MG/1
1 TABLET, FILM COATED ORAL
Qty: 0 | Refills: 0 | DISCHARGE

## 2021-04-05 RX ORDER — ALLOPURINOL 300 MG
1 TABLET ORAL
Qty: 0 | Refills: 0 | DISCHARGE

## 2021-04-05 RX ORDER — LORATADINE 10 MG/1
1 TABLET ORAL
Qty: 0 | Refills: 0 | DISCHARGE

## 2021-04-05 RX ORDER — ALLOPURINOL 300 MG
100 TABLET ORAL
Refills: 0 | Status: DISCONTINUED | OUTPATIENT
Start: 2021-04-05 | End: 2021-04-09

## 2021-04-05 RX ORDER — LORATADINE 10 MG/1
10 TABLET ORAL DAILY
Refills: 0 | Status: DISCONTINUED | OUTPATIENT
Start: 2021-04-05 | End: 2021-04-09

## 2021-04-05 RX ORDER — ATROPA BELLADONNA AND OPIUM 16.2; 6 MG/1; MG/1
1 SUPPOSITORY RECTAL EVERY 8 HOURS
Refills: 0 | Status: DISCONTINUED | OUTPATIENT
Start: 2021-04-05 | End: 2021-04-09

## 2021-04-05 RX ORDER — AMLODIPINE BESYLATE 2.5 MG/1
5 TABLET ORAL DAILY
Refills: 0 | Status: DISCONTINUED | OUTPATIENT
Start: 2021-04-05 | End: 2021-04-09

## 2021-04-05 RX ORDER — CEFAZOLIN SODIUM 1 G
1000 VIAL (EA) INJECTION EVERY 8 HOURS
Refills: 0 | Status: DISCONTINUED | OUTPATIENT
Start: 2021-04-05 | End: 2021-04-05

## 2021-04-05 RX ADMIN — Medication 1 TABLET(S): at 21:36

## 2021-04-05 RX ADMIN — HEPARIN SODIUM 5000 UNIT(S): 5000 INJECTION INTRAVENOUS; SUBCUTANEOUS at 21:36

## 2021-04-05 RX ADMIN — Medication 15 MILLIGRAM(S): at 14:27

## 2021-04-05 RX ADMIN — Medication 15 MILLIGRAM(S): at 21:36

## 2021-04-05 RX ADMIN — ATROPA BELLADONNA AND OPIUM 1 SUPPOSITORY(S): 16.2; 6 SUPPOSITORY RECTAL at 17:25

## 2021-04-05 RX ADMIN — SODIUM CHLORIDE 75 MILLILITER(S): 9 INJECTION, SOLUTION INTRAVENOUS at 13:59

## 2021-04-05 RX ADMIN — Medication 15 MILLIGRAM(S): at 14:18

## 2021-04-05 RX ADMIN — LOSARTAN POTASSIUM 25 MILLIGRAM(S): 100 TABLET, FILM COATED ORAL at 17:25

## 2021-04-05 RX ADMIN — HEPARIN SODIUM 5000 UNIT(S): 5000 INJECTION INTRAVENOUS; SUBCUTANEOUS at 14:17

## 2021-04-05 RX ADMIN — Medication 15 MILLIGRAM(S): at 22:06

## 2021-04-05 NOTE — PROGRESS NOTE ADULT - SUBJECTIVE AND OBJECTIVE BOX
Post op Check    Pt seen and examined at the bedside. Feels some pressure in the lower abdomen like he has to urinate or pass stool. Pain is controlled. Has not passed gas yet. Denies shortness of breath/difficulty breathing, chest pain, nausea/vomiting, abd pain or other acute complaint.     Vital Signs Last 24 Hrs  T(C): 36.2 (05 Apr 2021 12:20), Max: 36.6 (05 Apr 2021 08:08)  T(F): 97.2 (05 Apr 2021 12:20), Max: 97.9 (05 Apr 2021 08:08)  HR: 102 (05 Apr 2021 14:30) (75 - 102)  BP: 136/85 (05 Apr 2021 14:30) (97/52 - 141/94)  BP(mean): 104 (05 Apr 2021 14:30) (70 - 104)  RR: 16 (05 Apr 2021 14:30) (16 - 26)  SpO2: 97% (05 Apr 2021 14:30) (95% - 100%)    I&O's Summary      Physical Exam  Gen: NAD, A&Ox3  Pulm: No respiratory distress, no subcostal retractions  Abd: Soft, Nontender, Nondistended. midline dressing C/D/I. suprapubic dressing mild saturated with SS output. MELCHOR in place with SS output. Penrose in place.  : Circ penis. Testes descended bilaterally and nontender. 22F 3way in place on moderate to fast gtt CBI with pinkish-red output. No clots appreciated in CBI tubing                          12.8   12.59 )-----------( 223      ( 05 Apr 2021 13:04 )             39.9       04-05    136  |  102  |  13  ----------------------------<  162<H>  3.6   |  25  |  0.91    Ca    8.0<L>      05 Apr 2021 13:04

## 2021-04-05 NOTE — PROGRESS NOTE ADULT - ASSESSMENT
A/P: 65y Male s/p open suprapubic prostatectomy. POD#0    - Bactrim for post op prophylaxis  - Clear Liquid Diet  - Monitor GI function  - Analgesia and antiemetics, as needed. Standing Toradol  - Belladonna suppositories added PRN for spasms while on CBI  - Continue CBI, will reassess color in AM and clamp.  - Maintain Madden, MELCHOR and Penrose  - AM labs  - Strict I&O's  - DVT prophylaxis/OOB as tolerated  - Incentive spirometry

## 2021-04-06 LAB
ANION GAP SERPL CALC-SCNC: 9 MMOL/L — SIGNIFICANT CHANGE UP (ref 5–17)
BASOPHILS # BLD AUTO: 0.02 K/UL — SIGNIFICANT CHANGE UP (ref 0–0.2)
BASOPHILS NFR BLD AUTO: 0.1 % — SIGNIFICANT CHANGE UP (ref 0–2)
BLD GP AB SCN SERPL QL: NEGATIVE — SIGNIFICANT CHANGE UP
BUN SERPL-MCNC: 23 MG/DL — SIGNIFICANT CHANGE UP (ref 7–23)
BUN SERPL-MCNC: 27 MG/DL — HIGH (ref 7–23)
CALCIUM SERPL-MCNC: 7.7 MG/DL — LOW (ref 8.4–10.5)
CALCIUM SERPL-MCNC: 7.9 MG/DL — LOW (ref 8.4–10.5)
CHLORIDE SERPL-SCNC: 103 MMOL/L — SIGNIFICANT CHANGE UP (ref 96–108)
CHLORIDE SERPL-SCNC: 106 MMOL/L — SIGNIFICANT CHANGE UP (ref 96–108)
CO2 SERPL-SCNC: 20 MMOL/L — LOW (ref 22–31)
CO2 SERPL-SCNC: 22 MMOL/L — SIGNIFICANT CHANGE UP (ref 22–31)
CREAT SERPL-MCNC: 1.45 MG/DL — HIGH (ref 0.5–1.3)
CREAT SERPL-MCNC: 1.56 MG/DL — HIGH (ref 0.5–1.3)
EOSINOPHIL # BLD AUTO: 0 K/UL — SIGNIFICANT CHANGE UP (ref 0–0.5)
EOSINOPHIL NFR BLD AUTO: 0 % — SIGNIFICANT CHANGE UP (ref 0–6)
GLUCOSE SERPL-MCNC: 123 MG/DL — HIGH (ref 70–99)
GLUCOSE SERPL-MCNC: 93 MG/DL — SIGNIFICANT CHANGE UP (ref 70–99)
HCT VFR BLD CALC: 27.4 % — LOW (ref 39–50)
HCT VFR BLD CALC: 32 % — LOW (ref 39–50)
HGB BLD-MCNC: 10 G/DL — LOW (ref 13–17)
HGB BLD-MCNC: 8.8 G/DL — LOW (ref 13–17)
IMM GRANULOCYTES NFR BLD AUTO: 0.3 % — SIGNIFICANT CHANGE UP (ref 0–1.5)
LYMPHOCYTES # BLD AUTO: 1.58 K/UL — SIGNIFICANT CHANGE UP (ref 1–3.3)
LYMPHOCYTES # BLD AUTO: 10.1 % — LOW (ref 13–44)
MCHC RBC-ENTMCNC: 29 PG — SIGNIFICANT CHANGE UP (ref 27–34)
MCHC RBC-ENTMCNC: 29.6 PG — SIGNIFICANT CHANGE UP (ref 27–34)
MCHC RBC-ENTMCNC: 31.3 GM/DL — LOW (ref 32–36)
MCHC RBC-ENTMCNC: 32.1 GM/DL — SIGNIFICANT CHANGE UP (ref 32–36)
MCV RBC AUTO: 92.3 FL — SIGNIFICANT CHANGE UP (ref 80–100)
MCV RBC AUTO: 92.8 FL — SIGNIFICANT CHANGE UP (ref 80–100)
MONOCYTES # BLD AUTO: 1.25 K/UL — HIGH (ref 0–0.9)
MONOCYTES NFR BLD AUTO: 8 % — SIGNIFICANT CHANGE UP (ref 2–14)
NEUTROPHILS # BLD AUTO: 12.8 K/UL — HIGH (ref 1.8–7.4)
NEUTROPHILS NFR BLD AUTO: 81.5 % — HIGH (ref 43–77)
NRBC # BLD: 0 /100 WBCS — SIGNIFICANT CHANGE UP (ref 0–0)
NRBC # BLD: 0 /100 WBCS — SIGNIFICANT CHANGE UP (ref 0–0)
PLATELET # BLD AUTO: 193 K/UL — SIGNIFICANT CHANGE UP (ref 150–400)
PLATELET # BLD AUTO: 216 K/UL — SIGNIFICANT CHANGE UP (ref 150–400)
POTASSIUM SERPL-MCNC: 4.3 MMOL/L — SIGNIFICANT CHANGE UP (ref 3.5–5.3)
POTASSIUM SERPL-MCNC: 4.6 MMOL/L — SIGNIFICANT CHANGE UP (ref 3.5–5.3)
POTASSIUM SERPL-SCNC: 4.3 MMOL/L — SIGNIFICANT CHANGE UP (ref 3.5–5.3)
POTASSIUM SERPL-SCNC: 4.6 MMOL/L — SIGNIFICANT CHANGE UP (ref 3.5–5.3)
RBC # BLD: 2.97 M/UL — LOW (ref 4.2–5.8)
RBC # BLD: 3.45 M/UL — LOW (ref 4.2–5.8)
RBC # FLD: 12.8 % — SIGNIFICANT CHANGE UP (ref 10.3–14.5)
RBC # FLD: 12.8 % — SIGNIFICANT CHANGE UP (ref 10.3–14.5)
RH IG SCN BLD-IMP: POSITIVE — SIGNIFICANT CHANGE UP
SODIUM SERPL-SCNC: 134 MMOL/L — LOW (ref 135–145)
SODIUM SERPL-SCNC: 138 MMOL/L — SIGNIFICANT CHANGE UP (ref 135–145)
WBC # BLD: 13.8 K/UL — HIGH (ref 3.8–10.5)
WBC # BLD: 15.7 K/UL — HIGH (ref 3.8–10.5)
WBC # FLD AUTO: 13.8 K/UL — HIGH (ref 3.8–10.5)
WBC # FLD AUTO: 15.7 K/UL — HIGH (ref 3.8–10.5)

## 2021-04-06 PROCEDURE — 76770 US EXAM ABDO BACK WALL COMP: CPT | Mod: 26

## 2021-04-06 RX ORDER — FUROSEMIDE 40 MG
10 TABLET ORAL ONCE
Refills: 0 | Status: COMPLETED | OUTPATIENT
Start: 2021-04-06 | End: 2021-04-06

## 2021-04-06 RX ORDER — SODIUM CHLORIDE 9 MG/ML
1000 INJECTION INTRAMUSCULAR; INTRAVENOUS; SUBCUTANEOUS ONCE
Refills: 0 | Status: COMPLETED | OUTPATIENT
Start: 2021-04-06 | End: 2021-04-06

## 2021-04-06 RX ORDER — SODIUM CHLORIDE 9 MG/ML
1000 INJECTION INTRAMUSCULAR; INTRAVENOUS; SUBCUTANEOUS
Refills: 0 | Status: COMPLETED | OUTPATIENT
Start: 2021-04-06 | End: 2022-03-05

## 2021-04-06 RX ORDER — SODIUM CHLORIDE 9 MG/ML
1000 INJECTION, SOLUTION INTRAVENOUS
Refills: 0 | Status: DISCONTINUED | OUTPATIENT
Start: 2021-04-06 | End: 2021-04-06

## 2021-04-06 RX ADMIN — Medication 10 MILLIGRAM(S): at 21:29

## 2021-04-06 RX ADMIN — OXYCODONE AND ACETAMINOPHEN 2 TABLET(S): 5; 325 TABLET ORAL at 01:20

## 2021-04-06 RX ADMIN — Medication 15 MILLIGRAM(S): at 06:30

## 2021-04-06 RX ADMIN — LORATADINE 10 MILLIGRAM(S): 10 TABLET ORAL at 14:06

## 2021-04-06 RX ADMIN — Medication 1 TABLET(S): at 06:13

## 2021-04-06 RX ADMIN — SODIUM CHLORIDE 1000 MILLILITER(S): 9 INJECTION INTRAMUSCULAR; INTRAVENOUS; SUBCUTANEOUS at 10:40

## 2021-04-06 RX ADMIN — SODIUM CHLORIDE 1000 MILLILITER(S): 9 INJECTION INTRAMUSCULAR; INTRAVENOUS; SUBCUTANEOUS at 00:47

## 2021-04-06 RX ADMIN — HEPARIN SODIUM 5000 UNIT(S): 5000 INJECTION INTRAVENOUS; SUBCUTANEOUS at 21:04

## 2021-04-06 RX ADMIN — OXYCODONE AND ACETAMINOPHEN 2 TABLET(S): 5; 325 TABLET ORAL at 00:49

## 2021-04-06 RX ADMIN — HEPARIN SODIUM 5000 UNIT(S): 5000 INJECTION INTRAVENOUS; SUBCUTANEOUS at 14:07

## 2021-04-06 RX ADMIN — Medication 15 MILLIGRAM(S): at 06:13

## 2021-04-06 RX ADMIN — HEPARIN SODIUM 5000 UNIT(S): 5000 INJECTION INTRAVENOUS; SUBCUTANEOUS at 06:13

## 2021-04-06 NOTE — PROVIDER CONTACT NOTE (OTHER) - SITUATION
Pt became hypotensive with 1st OOB. BP sitting 94/58; standing 78/53. Pt placed back to bed BP increased s108.

## 2021-04-06 NOTE — PROGRESS NOTE ADULT - ASSESSMENT
A/P: 65y Male s/p open suprapubic prostatectomy. POD#1    - Bactrim for post op prophylaxis  - Clear Liquid Diet  - Monitor GI function  - Analgesia and antiemetics, as needed. Standing Toradol  - Belladonna suppositories added PRN for spasms while on CBI  - Continue CBI, will reassess color and wean as able   - Maintain Madden, MELCHOR and Penrose  - AM labs  - Strict I&O's  - DVT prophylaxis/OOB as tolerated  - Incentive spirometry - one given by team to pt

## 2021-04-06 NOTE — CHART NOTE - NSCHARTNOTEFT_GEN_A_CORE
Pt seen and examined.  Feeling well. Tolerated solid food. No acute complaints at this time. Denies lightheadedness, chest pain, shortness of breath, N/V, abd pain, suprapubic pain or other acute symptoms.    Vital Signs Last 24 Hrs  T(C): 37.1 (06 Apr 2021 13:36), Max: 37.2 (06 Apr 2021 00:54)  T(F): 98.7 (06 Apr 2021 13:36), Max: 98.9 (06 Apr 2021 00:54)  HR: 79 (06 Apr 2021 13:36) (72 - 100)  BP: 99/61 (06 Apr 2021 13:36) (92/60 - 127/76)  BP(mean): 84 (05 Apr 2021 15:30) (84 - 84)  RR: 18 (06 Apr 2021 13:36) (16 - 18)  SpO2: 97% (06 Apr 2021 13:36) (97% - 99%)    General: NAD, Lying in bed comfortably  Neuro: A+Ox3, no focal deficits  Resp: No respiratory distress or accessory muscle use. no supplemental O2  Abd: Soft, nontender, mild distension. no rebound/guarding. midline incision C/D/I, no erythema or discharge. MELCHOR with SS output.  : Suprapubic dressing C/D/I. 22F 3way secured on slow stream CBI with clear peach tinged output. No clot appreciated in tubing.  Vascular: All 4 extremities warm and appear well perfused  Skin: Intact, no breakdown  Musculoskeletal: All 4 extremities moving spontaneously, no limitations. SCDs on LE bilaterally.  (Remains unchanged from this morning.)                          8.8    13.80 )-----------( 193      ( 06 Apr 2021 13:12 )             27.4     04-06    138  |  106  |  27<H>  ----------------------------<  93  4.3   |  20<L>  |  1.56<H>    Ca    7.7<L>      06 Apr 2021 13:12    A/P: 65y Male s/p open suprapubic prostatectomy. POD#1  - Keep CBI at this rate. Wean as tolerated  - Continue to monitor urine color  - 1L NS over 1hour and continue IVF for hydratio  - Monitor BP and vitals   - tolerating diet  - will obtain renal US given SCr uptrending  - Will give 2U PRBC with 10mg IVP of Lasix in between.      Discussed with Dr. Muhammad.
Pt seen and examined at the bedside.    Pt reports that he feels well. No further lower abdominal pressure from CBI. Pain adequately controlled. Passed flatus this AM after drinking coffee. Reports having seasonal allergies, but does not want to take Claritin because he only takes it when allergy symptoms are severe. Has not gotten out of bed yet. Denies chest pain, shortness of breath, N/V, abd pain, N/V, or other acute complaint.     ICU Vital Signs Last 24 Hrs  T(C): 36.5 (06 Apr 2021 09:11), Max: 37.2 (06 Apr 2021 00:54)  T(F): 97.7 (06 Apr 2021 09:11), Max: 98.9 (06 Apr 2021 00:54)  HR: 80 (06 Apr 2021 09:11) (80 - 102)  BP: 92/60 (06 Apr 2021 09:11) (92/60 - 136/85)  BP(mean): 84 (05 Apr 2021 15:30) (70 - 104)  RR: 18 (06 Apr 2021 09:11) (16 - 26)  SpO2: 99% (06 Apr 2021 09:11) (95% - 100%)    General: NAD, Lying in bed comfortably  Neuro: A+Ox3, no focal deficits  Resp: No respiratory distress or accessory muscle use. no supplemental O2  Abd: Soft, nontender, mild distension. no rebound/guarding. midline incision C/D/I, no erythema or discharge. MELCHOR with SS output.  : Suprapubic dressing C/D/I. 22F 3way secured on slow stream CBI with clear peach tinged output. No clot appreciated in tubing.  Vascular: All 4 extremities warm and appear well perfused  Skin: Intact, no breakdown  Musculoskeletal: All 4 extremities moving spontaneously, no limitations. SCDs on LE bilaterally.    A/P: 65y Male s/p open suprapubic prostatectomy. POD#1  - Keep CBI at this rate. Wean as tolerated  - Continue to monitor urine color  - 1L NS over 1hour and continue IVF for hydration  - Recheck BP   - OOB to chair. Will obtain orthostatic vitals  - Will advance diet given pt had flatus.     Discussed with Dr. Muhammad.
Reassessed pt's CBI color.    HPI: Pt seen and examined at the bedside. Pt states that he is feeling well without complaints. Belladonna helped with pressure like sensation in lower abdomen. Comfortable now. Has passed gas. Denies chest pain, SOB, abd pain, N/V, or other acute complaint.     Vital Signs Last 24 Hrs:  T(C): 36.4 (05 Apr 2021 18:55), Max: 36.6 (05 Apr 2021 08:08)  T(F): 97.6 (05 Apr 2021 18:55), Max: 97.9 (05 Apr 2021 08:08)  HR: 97 (05 Apr 2021 18:55) (75 - 102)  BP: 119/77 (05 Apr 2021 18:55) (97/52 - 141/94)  BP(mean): 84 (05 Apr 2021 15:30) (70 - 104)  RR: 18 (05 Apr 2021 18:55) (16 - 26)  SpO2: 98% (05 Apr 2021 18:55) (95% - 100%)    General: NAD, Lying in bed comfortably  Neuro: A+Ox3, no focal deficits  Resp: No respiratory distress or accessory muscle use. no supplemental O2  Abd: Soft, NT/ND, no rebound/guarding. midline dressing mildly saturated. MELCHOR with minimal SS output.  : Circ penis. 22F secured on moderate to fast gtt CBI with red output. Increased speed to moderate stream and output became peach. Small clot visualized in tubing, but passed quickly and no further clots.   Vascular: All 4 extremities warm and appear well perfused  Skin: Intact, no breakdown  Musculoskeletal: All 4 extremities moving spontaneously, no limitations.      A/P: 65y Male s/p open suprapubic prostatectomy. POD#0  - Keep CBI at this rate. Titrate as tolerated  - Continue to monitor urine color  - Vitals have been stable, but will check one CBC now to trend H/H  - Will continue to monitor    Discussed with Dr. Leonard and Dr. Muhammad

## 2021-04-06 NOTE — PROGRESS NOTE ADULT - SUBJECTIVE AND OBJECTIVE BOX
Subjective  pain controlled this am; has not been oob; no nausea or vomiting; no bm or flatus   cbi cont     Objective    Vital signs  T(F): , Max: 98.9 (04-06-21 @ 00:54)  HR: 85 (04-06-21 @ 06:10)  BP: 101/66 (04-06-21 @ 06:10)  SpO2: 98% (04-06-21 @ 06:10)  Wt(kg): --    Output     04-05 @ 07:01  -  04-06 @ 06:58  --------------------------------------------------------  IN: 2845 mL / OUT: 205 mL / NET: 2640 mL        Gen awake alert nad axox3  Abd soft approp tender nd, incision c/d/i   penrose with serosang output  ranjith in place serosang output     CHAS banuelos in place cbi slow and clear with one tubing clot that was removed     Labs      04-05 @ 20:37    WBC 19.43 / Hct 42.4  / SCr --       04-05 @ 13:04    WBC 12.59 / Hct 39.9  / SCr 0.91

## 2021-04-07 ENCOUNTER — RESULT REVIEW (OUTPATIENT)
Age: 66
End: 2021-04-07

## 2021-04-07 LAB
ANION GAP SERPL CALC-SCNC: 11 MMOL/L — SIGNIFICANT CHANGE UP (ref 5–17)
BUN SERPL-MCNC: 22 MG/DL — SIGNIFICANT CHANGE UP (ref 7–23)
CALCIUM SERPL-MCNC: 7.8 MG/DL — LOW (ref 8.4–10.5)
CHLORIDE SERPL-SCNC: 106 MMOL/L — SIGNIFICANT CHANGE UP (ref 96–108)
CO2 SERPL-SCNC: 21 MMOL/L — LOW (ref 22–31)
CREAT SERPL-MCNC: 1.12 MG/DL — SIGNIFICANT CHANGE UP (ref 0.5–1.3)
GLUCOSE SERPL-MCNC: 113 MG/DL — HIGH (ref 70–99)
HCT VFR BLD CALC: 31.3 % — LOW (ref 39–50)
HGB BLD-MCNC: 10.4 G/DL — LOW (ref 13–17)
MCHC RBC-ENTMCNC: 28.8 PG — SIGNIFICANT CHANGE UP (ref 27–34)
MCHC RBC-ENTMCNC: 33.2 GM/DL — SIGNIFICANT CHANGE UP (ref 32–36)
MCV RBC AUTO: 86.7 FL — SIGNIFICANT CHANGE UP (ref 80–100)
NRBC # BLD: 0 /100 WBCS — SIGNIFICANT CHANGE UP (ref 0–0)
PLATELET # BLD AUTO: 182 K/UL — SIGNIFICANT CHANGE UP (ref 150–400)
POTASSIUM SERPL-MCNC: 4 MMOL/L — SIGNIFICANT CHANGE UP (ref 3.5–5.3)
POTASSIUM SERPL-SCNC: 4 MMOL/L — SIGNIFICANT CHANGE UP (ref 3.5–5.3)
RBC # BLD: 3.61 M/UL — LOW (ref 4.2–5.8)
RBC # FLD: 14.8 % — HIGH (ref 10.3–14.5)
SODIUM SERPL-SCNC: 138 MMOL/L — SIGNIFICANT CHANGE UP (ref 135–145)
WBC # BLD: 14.05 K/UL — HIGH (ref 3.8–10.5)
WBC # FLD AUTO: 14.05 K/UL — HIGH (ref 3.8–10.5)

## 2021-04-07 PROCEDURE — 88300 SURGICAL PATH GROSS: CPT | Mod: 26

## 2021-04-07 RX ORDER — LOSARTAN POTASSIUM 100 MG/1
25 TABLET, FILM COATED ORAL DAILY
Refills: 0 | Status: DISCONTINUED | OUTPATIENT
Start: 2021-04-07 | End: 2021-04-09

## 2021-04-07 RX ORDER — SODIUM CHLORIDE 9 MG/ML
1000 INJECTION INTRAMUSCULAR; INTRAVENOUS; SUBCUTANEOUS
Refills: 0 | Status: DISCONTINUED | OUTPATIENT
Start: 2021-04-07 | End: 2021-04-09

## 2021-04-07 RX ADMIN — OXYCODONE AND ACETAMINOPHEN 1 TABLET(S): 5; 325 TABLET ORAL at 15:16

## 2021-04-07 RX ADMIN — HEPARIN SODIUM 5000 UNIT(S): 5000 INJECTION INTRAVENOUS; SUBCUTANEOUS at 13:08

## 2021-04-07 RX ADMIN — AMLODIPINE BESYLATE 5 MILLIGRAM(S): 2.5 TABLET ORAL at 05:55

## 2021-04-07 RX ADMIN — ATROPA BELLADONNA AND OPIUM 1 SUPPOSITORY(S): 16.2; 6 SUPPOSITORY RECTAL at 17:12

## 2021-04-07 RX ADMIN — HEPARIN SODIUM 5000 UNIT(S): 5000 INJECTION INTRAVENOUS; SUBCUTANEOUS at 20:49

## 2021-04-07 RX ADMIN — MORPHINE SULFATE 4 MILLIGRAM(S): 50 CAPSULE, EXTENDED RELEASE ORAL at 21:17

## 2021-04-07 RX ADMIN — Medication 1 ENEMA: at 08:02

## 2021-04-07 RX ADMIN — OXYCODONE AND ACETAMINOPHEN 2 TABLET(S): 5; 325 TABLET ORAL at 00:49

## 2021-04-07 RX ADMIN — ATROPA BELLADONNA AND OPIUM 1 SUPPOSITORY(S): 16.2; 6 SUPPOSITORY RECTAL at 16:42

## 2021-04-07 RX ADMIN — OXYCODONE AND ACETAMINOPHEN 2 TABLET(S): 5; 325 TABLET ORAL at 01:21

## 2021-04-07 RX ADMIN — OXYCODONE AND ACETAMINOPHEN 1 TABLET(S): 5; 325 TABLET ORAL at 15:46

## 2021-04-07 RX ADMIN — HEPARIN SODIUM 5000 UNIT(S): 5000 INJECTION INTRAVENOUS; SUBCUTANEOUS at 05:55

## 2021-04-07 RX ADMIN — LORATADINE 10 MILLIGRAM(S): 10 TABLET ORAL at 13:08

## 2021-04-07 RX ADMIN — MORPHINE SULFATE 4 MILLIGRAM(S): 50 CAPSULE, EXTENDED RELEASE ORAL at 20:47

## 2021-04-07 NOTE — PHYSICAL THERAPY INITIAL EVALUATION ADULT - PLANNED THERAPY INTERVENTIONS, PT EVAL
1. GOAL: In 3 weeks, pt will be able to navigate 1 flight of stairs independently./bed mobility training/gait training/transfer training

## 2021-04-07 NOTE — PHYSICAL THERAPY INITIAL EVALUATION ADULT - ADDITIONAL COMMENTS
Pt lives w/ spouse in the basement apartment of a pvt home, 1 flight down to enter. States he can stay on the main floor w/ only 4 steps to enter. PTA pt reports being independent w/ all functional mobility & did not utilize an AD for ambulation.

## 2021-04-07 NOTE — PROGRESS NOTE ADULT - SUBJECTIVE AND OBJECTIVE BOX
The patient was seen and examined at bedside.  Denies complaints of chest pain, shortness of breath, nausea, acute pain.    T(C): 37 (04-07-21 @ 06:01), Max: 37.5 (04-06-21 @ 22:00)  HR: 84 (04-07-21 @ 06:01) (72 - 93)  BP: 133/87 (04-07-21 @ 06:01) (92/60 - 143/87)  RR: 18 (04-07-21 @ 06:01) (18 - 18)  SpO2: 95% (04-07-21 @ 06:01) (95% - 99%)  Wt(kg): --    Physical Exam:    General: NAD, A+Ox3  Abdomen: soft, non-tender, non-distended; penrose drain with minimal serosanguinous drainage        04-06 @ 07:01  -  04-07 @ 07:00  --------------------------------------------------------  IN: 1280 mL / OUT: 15 mL / NET: 1265 mL    CBI - clear    MELCHOR - 5cc sanguinous                      10.4   14.05 )-----------( 182               31.3     138  |  106  |  22  ----------------------------<  113  4.0   |  21  |  1.12    Ca    7.8

## 2021-04-07 NOTE — PROGRESS NOTE ADULT - ASSESSMENT
65 year old male s/p open suprapubic prostatectomy POD#2    - continue regular diet  - enema this AM  - Monitor GI function  - Analgesia and antiemetics, as needed. Standing Toradol  - Belladonna suppositories added PRN for spasms while on CBI  - Continue CBI, will reassess color and wean as able   - Maintain Madden, MELCHOR and Penrose  - may need repeat labs, s/p 2U PRBCs 4/6  - Strict I&O's  - DVT prophylaxis/OOB as tolerated  - Incentive spirometry - one given by team to pt

## 2021-04-07 NOTE — PHYSICAL THERAPY INITIAL EVALUATION ADULT - PERTINENT HX OF CURRENT PROBLEM, REHAB EVAL
65 y.o. M PMH HTN, prediabetes, seasonal allergies, BPH, cystoscopy (2/2021) demonstrated 6cm prostatic urethra & trilobar hypertrophy obstructing the urinary outlet, MRI demonstrated enlarged prostate w/intermediate prostatic lesion, c/o urinary incontinence & frequency. Now s/p open suprapubic prostatectomy on 4/5/2021.

## 2021-04-08 ENCOUNTER — TRANSCRIPTION ENCOUNTER (OUTPATIENT)
Age: 66
End: 2021-04-08

## 2021-04-08 LAB
ANION GAP SERPL CALC-SCNC: 11 MMOL/L — SIGNIFICANT CHANGE UP (ref 5–17)
BUN SERPL-MCNC: 15 MG/DL — SIGNIFICANT CHANGE UP (ref 7–23)
CALCIUM SERPL-MCNC: 7.6 MG/DL — LOW (ref 8.4–10.5)
CHLORIDE SERPL-SCNC: 107 MMOL/L — SIGNIFICANT CHANGE UP (ref 96–108)
CO2 SERPL-SCNC: 21 MMOL/L — LOW (ref 22–31)
CREAT SERPL-MCNC: 0.82 MG/DL — SIGNIFICANT CHANGE UP (ref 0.5–1.3)
GLUCOSE SERPL-MCNC: 103 MG/DL — HIGH (ref 70–99)
HCT VFR BLD CALC: 27 % — LOW (ref 39–50)
HGB BLD-MCNC: 8.9 G/DL — LOW (ref 13–17)
MCHC RBC-ENTMCNC: 29.5 PG — SIGNIFICANT CHANGE UP (ref 27–34)
MCHC RBC-ENTMCNC: 33 GM/DL — SIGNIFICANT CHANGE UP (ref 32–36)
MCV RBC AUTO: 89.4 FL — SIGNIFICANT CHANGE UP (ref 80–100)
NRBC # BLD: 0 /100 WBCS — SIGNIFICANT CHANGE UP (ref 0–0)
PLATELET # BLD AUTO: 169 K/UL — SIGNIFICANT CHANGE UP (ref 150–400)
POTASSIUM SERPL-MCNC: 3.7 MMOL/L — SIGNIFICANT CHANGE UP (ref 3.5–5.3)
POTASSIUM SERPL-SCNC: 3.7 MMOL/L — SIGNIFICANT CHANGE UP (ref 3.5–5.3)
RBC # BLD: 3.02 M/UL — LOW (ref 4.2–5.8)
RBC # FLD: 14.5 % — SIGNIFICANT CHANGE UP (ref 10.3–14.5)
SODIUM SERPL-SCNC: 139 MMOL/L — SIGNIFICANT CHANGE UP (ref 135–145)
SURGICAL PATHOLOGY STUDY: SIGNIFICANT CHANGE UP
WBC # BLD: 15.61 K/UL — HIGH (ref 3.8–10.5)
WBC # FLD AUTO: 15.61 K/UL — HIGH (ref 3.8–10.5)

## 2021-04-08 RX ADMIN — HEPARIN SODIUM 5000 UNIT(S): 5000 INJECTION INTRAVENOUS; SUBCUTANEOUS at 21:22

## 2021-04-08 RX ADMIN — LOSARTAN POTASSIUM 25 MILLIGRAM(S): 100 TABLET, FILM COATED ORAL at 05:12

## 2021-04-08 RX ADMIN — HEPARIN SODIUM 5000 UNIT(S): 5000 INJECTION INTRAVENOUS; SUBCUTANEOUS at 05:10

## 2021-04-08 RX ADMIN — HEPARIN SODIUM 5000 UNIT(S): 5000 INJECTION INTRAVENOUS; SUBCUTANEOUS at 13:04

## 2021-04-08 RX ADMIN — OXYCODONE AND ACETAMINOPHEN 1 TABLET(S): 5; 325 TABLET ORAL at 18:11

## 2021-04-08 RX ADMIN — OXYCODONE AND ACETAMINOPHEN 1 TABLET(S): 5; 325 TABLET ORAL at 14:30

## 2021-04-08 RX ADMIN — AMLODIPINE BESYLATE 5 MILLIGRAM(S): 2.5 TABLET ORAL at 05:10

## 2021-04-08 NOTE — PROGRESS NOTE ADULT - SUBJECTIVE AND OBJECTIVE BOX
Subjective  low grade temp of 100.2 overnight; has been oob with pt, occ uses incentive spirometer; pamella reg diet and pain controlled   penrose removed yesterday   Objective    Vital signs  T(F): , Max: 100.2 (04-08-21 @ 01:02)  HR: 78 (04-08-21 @ 05:09)  BP: 132/81 (04-08-21 @ 05:09)  SpO2: 98% (04-08-21 @ 05:09)  Wt(kg): --    Output     04-07 @ 07:01  -  04-08 @ 07:00  --------------------------------------------------------  IN: 440 mL / OUT: 0 mL / NET: 440 mL        Gen awake alert nad axox3  Abd obese soft ntnd   incision c/d/i staples in place      banuelos in place cbi slow and urine clear yellow   penrose site c/d/i     Labs      04-07 @ 04:22    WBC 14.05 / Hct 31.3  / SCr 1.12     04-06 @ 13:12    WBC 13.80 / Hct 27.4  / SCr 1.56          Subjective  low grade temp of 100.2 overnight; has been oob with pt, occ uses incentive spirometer; pamella reg diet and pain controlled   penrose removed yesterday   20cc removed from balloon today  Objective    Vital signs  T(F): , Max: 100.2 (04-08-21 @ 01:02)  HR: 78 (04-08-21 @ 05:09)  BP: 132/81 (04-08-21 @ 05:09)  SpO2: 98% (04-08-21 @ 05:09)  Wt(kg): --    Output     04-07 @ 07:01  -  04-08 @ 07:00  --------------------------------------------------------  IN: 440 mL / OUT: 0 mL / NET: 440 mL        Gen awake alert nad axox3  Abd obese soft ntnd   incision c/d/i staples in place      banuelos in place cbi slow and urine clear yellow   penrose site c/d/i     Labs      04-07 @ 04:22    WBC 14.05 / Hct 31.3  / SCr 1.12     04-06 @ 13:12    WBC 13.80 / Hct 27.4  / SCr 1.56

## 2021-04-08 NOTE — DISCHARGE NOTE NURSING/CASE MANAGEMENT/SOCIAL WORK - PATIENT PORTAL LINK FT
You can access the FollowMyHealth Patient Portal offered by [facility] by registering at the following website: [email or web address]. By joining [facility]’s FollowMyHealth portal, you will also be able to view your health information using other applications (apps) compatible with our system.

## 2021-04-08 NOTE — DISCHARGE NOTE PROVIDER - NSDCCPTREATMENT_GEN_ALL_CORE_FT
PRINCIPAL PROCEDURE  Procedure: Suprapubic prostatectomy  Findings and Treatment:       SECONDARY PROCEDURE  Procedure: Exploratory laparotomy  Findings and Treatment:

## 2021-04-08 NOTE — DISCHARGE NOTE PROVIDER - HOSPITAL COURSE
64yo male admitted 4/5 s/p scheduled suprapubic prostatectomy. Post op on CBI with MELCHOR drain and penrose. On clear diet which was well tolerated.    66yo male admitted 4/5 s/p scheduled suprapubic prostatectomy. Post op on CBI with MELCHOR drain and penrose. On clear diet which was well tolerated.   POD#1 (4/6)- passed flatus, advanced to regular diet, tolerated well, s/p 2U PRBCs   POD#2- penrose removed, CBI continued  POD#3- low grade fever overnight, CBI weaned   POD#4- CBI weaned off overnight, RTOR for removal of retained MELCHOR  POD#5- plan for CT cystogram prior to removal   POD#7- CT cystogram confirmed no leak, banuelos removed and patient passed tov  POD#8- PVR 350cc, patient comfortable. cleared for discharge on 2 additional days of cipro, finasteride and ferrous sulfate   plan for home PT  AVSS, hemodynamically stable

## 2021-04-08 NOTE — DISCHARGE NOTE PROVIDER - NSDCMRMEDTOKEN_GEN_ALL_CORE_FT
allopurinol 100 mg oral tablet: 1 tab(s) orally 2 times a day, As Needed  amLODIPine 5 mg oral tablet: 1 tab(s) orally once a day  Claritin 24 Hour Allergy 10 mg oral tablet: 1 tab(s) orally once a day, As Needed  losartan 25 mg oral tablet: 1 tab(s) orally once a day  Leyla Walker : Use as directed    allopurinol 100 mg oral tablet: 1 tab(s) orally 2 times a day, As Needed  amLODIPine 5 mg oral tablet: 1 tab(s) orally once a day  ciprofloxacin 500 mg oral tablet: 1 tab(s) orally every 12 hours  Claritin 24 Hour Allergy 10 mg oral tablet: 1 tab(s) orally once a day, As Needed  Colace 100 mg oral capsule: 1 cap(s) orally 2 times a day   ferrous sulfate 324 mg (65 mg elemental iron) oral delayed release tablet: 1 tab(s) orally once a day   finasteride 5 mg oral tablet: 1 tab(s) orally once a day   losartan 25 mg oral tablet: 1 tab(s) orally once a day  oxycodone-acetaminophen 5 mg-325 mg oral tablet: 1 tab(s) orally every 6 hours, As Needed -) - for moderate pain MDD:4tabs   Rolling Walker : Use as directed

## 2021-04-08 NOTE — DISCHARGE NOTE PROVIDER - CARE PROVIDER_API CALL
Fernando Muhammad)  Urology  [address] [place] [address], [place] [address]  Phone: [phone]  Fax: [phone]  Follow Up Time:

## 2021-04-08 NOTE — PROGRESS NOTE ADULT - ASSESSMENT
65 year old male s/p open suprapubic prostatectomy POD#3; acute blood loss anemia due to surgery requiring blood transfusion 4/6  - oob and ambulate today  - trend fever curve   - continue regular diet  - Monitor GI function  - Analgesia and antiemetics, as needed.   - Belladonna suppositories added PRN for spasms while on CBI  - will wean cbi to off today   - Maintain Madden, MELCHOR will be removed on dc   - ck am labs   - Strict I&O's  - DVT prophylaxis/OOB as tolerated  - Incentive spirometry - re education provided

## 2021-04-08 NOTE — DISCHARGE NOTE PROVIDER - NSDCCPCAREPLAN_GEN_ALL_CORE_FT
PRINCIPAL DISCHARGE DIAGNOSIS  Diagnosis: BPH (benign prostatic hyperplasia)  Assessment and Plan of Treatment: s/p suprapubic prostatectomy   Call the office if you have fever greater than 101, difficulty urinating, pain not relieved with pain medication, nausea/vomiting. Drink plenty of fluids.  No heavy lifting or straining for 4 to 6 weeks. Avoid becoming constipated. You may have intermittent pink tinged urine and urinary dribbling.  This is normal.   If your urine becomes bright red or with clots, please call the office. recommend colace/senna while taking narcotic pain medication to avoid constipation. Do not drive while taking narcotic pain medication. complete additional 2 days of cipro antibiotic.      SECONDARY DISCHARGE DIAGNOSES  Diagnosis: HTN (hypertension)  Assessment and Plan of Treatment: STOP taking amlodipine and losartan until you follow up with Dr. Muhammad or your PCP

## 2021-04-09 LAB
ANION GAP SERPL CALC-SCNC: 10 MMOL/L — SIGNIFICANT CHANGE UP (ref 5–17)
BUN SERPL-MCNC: 13 MG/DL — SIGNIFICANT CHANGE UP (ref 7–23)
CALCIUM SERPL-MCNC: 8.5 MG/DL — SIGNIFICANT CHANGE UP (ref 8.4–10.5)
CHLORIDE SERPL-SCNC: 106 MMOL/L — SIGNIFICANT CHANGE UP (ref 96–108)
CO2 SERPL-SCNC: 23 MMOL/L — SIGNIFICANT CHANGE UP (ref 22–31)
COVID-19 SPIKE DOMAIN AB INTERP: POSITIVE
COVID-19 SPIKE DOMAIN ANTIBODY RESULT: 0.91 U/ML — HIGH
CREAT SERPL-MCNC: 0.85 MG/DL — SIGNIFICANT CHANGE UP (ref 0.5–1.3)
GLUCOSE SERPL-MCNC: 96 MG/DL — SIGNIFICANT CHANGE UP (ref 70–99)
HCT VFR BLD CALC: 29.6 % — LOW (ref 39–50)
HGB BLD-MCNC: 9.4 G/DL — LOW (ref 13–17)
MCHC RBC-ENTMCNC: 28.5 PG — SIGNIFICANT CHANGE UP (ref 27–34)
MCHC RBC-ENTMCNC: 31.8 GM/DL — LOW (ref 32–36)
MCV RBC AUTO: 89.7 FL — SIGNIFICANT CHANGE UP (ref 80–100)
NRBC # BLD: 0 /100 WBCS — SIGNIFICANT CHANGE UP (ref 0–0)
PLATELET # BLD AUTO: 237 K/UL — SIGNIFICANT CHANGE UP (ref 150–400)
POTASSIUM SERPL-MCNC: 4 MMOL/L — SIGNIFICANT CHANGE UP (ref 3.5–5.3)
POTASSIUM SERPL-SCNC: 4 MMOL/L — SIGNIFICANT CHANGE UP (ref 3.5–5.3)
RBC # BLD: 3.3 M/UL — LOW (ref 4.2–5.8)
RBC # FLD: 14 % — SIGNIFICANT CHANGE UP (ref 10.3–14.5)
SARS-COV-2 IGG+IGM SERPL QL IA: 0.91 U/ML — HIGH
SARS-COV-2 IGG+IGM SERPL QL IA: POSITIVE
SARS-COV-2 RNA SPEC QL NAA+PROBE: SIGNIFICANT CHANGE UP
SODIUM SERPL-SCNC: 139 MMOL/L — SIGNIFICANT CHANGE UP (ref 135–145)
WBC # BLD: 13.22 K/UL — HIGH (ref 3.8–10.5)
WBC # FLD AUTO: 13.22 K/UL — HIGH (ref 3.8–10.5)

## 2021-04-09 PROCEDURE — 49000 EXPLORATION OF ABDOMEN: CPT | Mod: 78

## 2021-04-09 RX ORDER — OXYCODONE AND ACETAMINOPHEN 5; 325 MG/1; MG/1
1 TABLET ORAL EVERY 4 HOURS
Refills: 0 | Status: DISCONTINUED | OUTPATIENT
Start: 2021-04-09 | End: 2021-04-13

## 2021-04-09 RX ORDER — HEPARIN SODIUM 5000 [USP'U]/ML
5000 INJECTION INTRAVENOUS; SUBCUTANEOUS EVERY 8 HOURS
Refills: 0 | Status: DISCONTINUED | OUTPATIENT
Start: 2021-04-09 | End: 2021-04-13

## 2021-04-09 RX ORDER — LOSARTAN POTASSIUM 100 MG/1
25 TABLET, FILM COATED ORAL DAILY
Refills: 0 | Status: DISCONTINUED | OUTPATIENT
Start: 2021-04-09 | End: 2021-04-12

## 2021-04-09 RX ORDER — LORATADINE 10 MG/1
10 TABLET ORAL DAILY
Refills: 0 | Status: DISCONTINUED | OUTPATIENT
Start: 2021-04-09 | End: 2021-04-13

## 2021-04-09 RX ORDER — ONDANSETRON 8 MG/1
4 TABLET, FILM COATED ORAL ONCE
Refills: 0 | Status: DISCONTINUED | OUTPATIENT
Start: 2021-04-09 | End: 2021-04-09

## 2021-04-09 RX ORDER — AMLODIPINE BESYLATE 2.5 MG/1
5 TABLET ORAL DAILY
Refills: 0 | Status: DISCONTINUED | OUTPATIENT
Start: 2021-04-09 | End: 2021-04-12

## 2021-04-09 RX ORDER — ALLOPURINOL 300 MG
100 TABLET ORAL
Refills: 0 | Status: DISCONTINUED | OUTPATIENT
Start: 2021-04-09 | End: 2021-04-13

## 2021-04-09 RX ORDER — LORATADINE 10 MG/1
10 TABLET ORAL DAILY
Refills: 0 | Status: DISCONTINUED | OUTPATIENT
Start: 2021-04-09 | End: 2021-04-12

## 2021-04-09 RX ORDER — SODIUM CHLORIDE 9 MG/ML
1000 INJECTION, SOLUTION INTRAVENOUS
Refills: 0 | Status: DISCONTINUED | OUTPATIENT
Start: 2021-04-09 | End: 2021-04-10

## 2021-04-09 RX ORDER — OXYCODONE AND ACETAMINOPHEN 5; 325 MG/1; MG/1
2 TABLET ORAL EVERY 6 HOURS
Refills: 0 | Status: DISCONTINUED | OUTPATIENT
Start: 2021-04-09 | End: 2021-04-13

## 2021-04-09 RX ORDER — CIPROFLOXACIN LACTATE 400MG/40ML
500 VIAL (ML) INTRAVENOUS EVERY 12 HOURS
Refills: 0 | Status: DISCONTINUED | OUTPATIENT
Start: 2021-04-09 | End: 2021-04-13

## 2021-04-09 RX ORDER — SENNA PLUS 8.6 MG/1
2 TABLET ORAL AT BEDTIME
Refills: 0 | Status: DISCONTINUED | OUTPATIENT
Start: 2021-04-09 | End: 2021-04-13

## 2021-04-09 RX ORDER — HYDROMORPHONE HYDROCHLORIDE 2 MG/ML
0.5 INJECTION INTRAMUSCULAR; INTRAVENOUS; SUBCUTANEOUS
Refills: 0 | Status: DISCONTINUED | OUTPATIENT
Start: 2021-04-09 | End: 2021-04-09

## 2021-04-09 RX ORDER — ATROPA BELLADONNA AND OPIUM 16.2; 6 MG/1; MG/1
1 SUPPOSITORY RECTAL EVERY 8 HOURS
Refills: 0 | Status: DISCONTINUED | OUTPATIENT
Start: 2021-04-09 | End: 2021-04-13

## 2021-04-09 RX ORDER — MORPHINE SULFATE 50 MG/1
4 CAPSULE, EXTENDED RELEASE ORAL
Refills: 0 | Status: DISCONTINUED | OUTPATIENT
Start: 2021-04-09 | End: 2021-04-13

## 2021-04-09 RX ORDER — ONDANSETRON 8 MG/1
4 TABLET, FILM COATED ORAL EVERY 6 HOURS
Refills: 0 | Status: DISCONTINUED | OUTPATIENT
Start: 2021-04-09 | End: 2021-04-13

## 2021-04-09 RX ORDER — ACETAMINOPHEN 500 MG
1000 TABLET ORAL ONCE
Refills: 0 | Status: COMPLETED | OUTPATIENT
Start: 2021-04-09 | End: 2021-04-09

## 2021-04-09 RX ADMIN — HEPARIN SODIUM 5000 UNIT(S): 5000 INJECTION INTRAVENOUS; SUBCUTANEOUS at 14:29

## 2021-04-09 RX ADMIN — SODIUM CHLORIDE 125 MILLILITER(S): 9 INJECTION, SOLUTION INTRAVENOUS at 18:09

## 2021-04-09 RX ADMIN — HEPARIN SODIUM 5000 UNIT(S): 5000 INJECTION INTRAVENOUS; SUBCUTANEOUS at 21:25

## 2021-04-09 RX ADMIN — HYDROMORPHONE HYDROCHLORIDE 0.5 MILLIGRAM(S): 2 INJECTION INTRAMUSCULAR; INTRAVENOUS; SUBCUTANEOUS at 11:00

## 2021-04-09 RX ADMIN — AMLODIPINE BESYLATE 5 MILLIGRAM(S): 2.5 TABLET ORAL at 06:06

## 2021-04-09 RX ADMIN — LORATADINE 10 MILLIGRAM(S): 10 TABLET ORAL at 21:25

## 2021-04-09 RX ADMIN — HYDROMORPHONE HYDROCHLORIDE 0.5 MILLIGRAM(S): 2 INJECTION INTRAMUSCULAR; INTRAVENOUS; SUBCUTANEOUS at 10:21

## 2021-04-09 RX ADMIN — LOSARTAN POTASSIUM 25 MILLIGRAM(S): 100 TABLET, FILM COATED ORAL at 06:06

## 2021-04-09 RX ADMIN — Medication 1000 MILLIGRAM(S): at 10:30

## 2021-04-09 RX ADMIN — Medication 400 MILLIGRAM(S): at 10:21

## 2021-04-09 RX ADMIN — Medication 500 MILLIGRAM(S): at 18:09

## 2021-04-09 RX ADMIN — HEPARIN SODIUM 5000 UNIT(S): 5000 INJECTION INTRAVENOUS; SUBCUTANEOUS at 06:07

## 2021-04-09 NOTE — PROGRESS NOTE ADULT - ASSESSMENT
65 year old male s/p open suprapubic prostatectomy, POD#4    -AM labs  -continue regular diet  -OOB/DVT prophylaxis  -incentive spirometry  -discharge planning with banuelos catheter  -will remove MELCHOR drain prior to discharge

## 2021-04-09 NOTE — PROGRESS NOTE ADULT - ASSESSMENT
65 year old male s/p open suprapubic prostatectomy 4/5, s/p ex-lap MELCHOR fragment retrieval    -regular diet  -continue CBI  -analgesia as needed  -OOB/DVT prophylaxis  -incentive spirometry

## 2021-04-09 NOTE — PROGRESS NOTE ADULT - SUBJECTIVE AND OBJECTIVE BOX
The patient was seen and examined at bedside.  Denies complaints of chest pain, shortness of breath, nausea, acute pain.    T(C): 37.1 (04-09-21 @ 06:18), Max: 37.1 (04-08-21 @ 14:06)  HR: 78 (04-09-21 @ 06:18) (69 - 84)  BP: 135/84 (04-09-21 @ 06:18) (91/60 - 135/84)  RR: 18 (04-09-21 @ 06:18) (18 - 18)  SpO2: 96% (04-09-21 @ 06:18) (96% - 97%)  Wt(kg): --    Physical Exam:    General: NAD, A+Ox3  Abdomen: soft, non-tender, +mild distention.  All abdominal incisions are c/d/i.        04-08 @ 07:01  -  04-09 @ 07:00  --------------------------------------------------------  IN: 1450 mL / OUT: 10 mL / NET: 1440 mL  MELCHOR - 0cc    CBI - clear (clamped overnight)

## 2021-04-09 NOTE — BRIEF OPERATIVE NOTE - NSICDXBRIEFPROCEDURE_GEN_ALL_CORE_FT
PROCEDURES:  Exploratory laparotomy 09-Apr-2021 09:40:20  Zachery Jaquez  
PROCEDURES:  Suprapubic prostatectomy 05-Apr-2021 12:37:06  Zachery Jaquez

## 2021-04-09 NOTE — PROGRESS NOTE ADULT - SUBJECTIVE AND OBJECTIVE BOX
The patient was seen and examined at bedside.  Denies complaints of chest pain, shortness of breath, nausea, acute pain.    T(C): 36.5 (04-09-21 @ 11:00), Max: 37.1 (04-08-21 @ 14:06)  HR: 72 (04-09-21 @ 11:30) (69 - 88)  BP: 112/65 (04-09-21 @ 11:30) (91/60 - 145/85)  RR: 16 (04-09-21 @ 11:30) (16 - 18)  SpO2: 96% (04-09-21 @ 11:30) (95% - 100%)  Wt(kg): --    Physical Exam:    General: NAD, A+Ox3  Abdomen: soft, non-tender, +mild distention.  Midline incision c/d/i.  +penrose in the suprapubic area      04-08 @ 07:01  -  04-09 @ 07:00  --------------------------------------------------------  IN: 1450 mL / OUT: 10 mL / NET: 1440 mL      CBI - light pink                          9.4    13.22 )-----------( 237      ( 09 Apr 2021 07:23 )             29.6       04-09    139  |  106  |  13  ----------------------------<  96  4.0   |  23  |  0.85    Ca    8.5      09 Apr 2021 07:19

## 2021-04-09 NOTE — PRE-ANESTHESIA EVALUATION ADULT - NSANTHOSAYNRD_GEN_A_CORE
No. MAI screening performed.  STOP BANG Legend: 0-2 = LOW Risk; 3-4 = INTERMEDIATE Risk; 5-8 = HIGH Risk
No. MAI screening performed.  STOP BANG Legend: 0-2 = LOW Risk; 3-4 = INTERMEDIATE Risk; 5-8 = HIGH Risk

## 2021-04-10 LAB
ANION GAP SERPL CALC-SCNC: 9 MMOL/L — SIGNIFICANT CHANGE UP (ref 5–17)
BUN SERPL-MCNC: 12 MG/DL — SIGNIFICANT CHANGE UP (ref 7–23)
CALCIUM SERPL-MCNC: 8.2 MG/DL — LOW (ref 8.4–10.5)
CHLORIDE SERPL-SCNC: 106 MMOL/L — SIGNIFICANT CHANGE UP (ref 96–108)
CO2 SERPL-SCNC: 25 MMOL/L — SIGNIFICANT CHANGE UP (ref 22–31)
CREAT SERPL-MCNC: 0.82 MG/DL — SIGNIFICANT CHANGE UP (ref 0.5–1.3)
GLUCOSE SERPL-MCNC: 100 MG/DL — HIGH (ref 70–99)
HCT VFR BLD CALC: 24.8 % — LOW (ref 39–50)
HGB BLD-MCNC: 8.1 G/DL — LOW (ref 13–17)
MCHC RBC-ENTMCNC: 29.2 PG — SIGNIFICANT CHANGE UP (ref 27–34)
MCHC RBC-ENTMCNC: 32.7 GM/DL — SIGNIFICANT CHANGE UP (ref 32–36)
MCV RBC AUTO: 89.5 FL — SIGNIFICANT CHANGE UP (ref 80–100)
NRBC # BLD: 0 /100 WBCS — SIGNIFICANT CHANGE UP (ref 0–0)
PLATELET # BLD AUTO: 261 K/UL — SIGNIFICANT CHANGE UP (ref 150–400)
POTASSIUM SERPL-MCNC: 4.1 MMOL/L — SIGNIFICANT CHANGE UP (ref 3.5–5.3)
POTASSIUM SERPL-SCNC: 4.1 MMOL/L — SIGNIFICANT CHANGE UP (ref 3.5–5.3)
RBC # BLD: 2.77 M/UL — LOW (ref 4.2–5.8)
RBC # FLD: 13.5 % — SIGNIFICANT CHANGE UP (ref 10.3–14.5)
SODIUM SERPL-SCNC: 140 MMOL/L — SIGNIFICANT CHANGE UP (ref 135–145)
WBC # BLD: 14.94 K/UL — HIGH (ref 3.8–10.5)
WBC # FLD AUTO: 14.94 K/UL — HIGH (ref 3.8–10.5)

## 2021-04-10 RX ADMIN — HEPARIN SODIUM 5000 UNIT(S): 5000 INJECTION INTRAVENOUS; SUBCUTANEOUS at 14:46

## 2021-04-10 RX ADMIN — HEPARIN SODIUM 5000 UNIT(S): 5000 INJECTION INTRAVENOUS; SUBCUTANEOUS at 05:28

## 2021-04-10 RX ADMIN — OXYCODONE AND ACETAMINOPHEN 2 TABLET(S): 5; 325 TABLET ORAL at 10:00

## 2021-04-10 RX ADMIN — AMLODIPINE BESYLATE 5 MILLIGRAM(S): 2.5 TABLET ORAL at 05:28

## 2021-04-10 RX ADMIN — OXYCODONE AND ACETAMINOPHEN 1 TABLET(S): 5; 325 TABLET ORAL at 02:45

## 2021-04-10 RX ADMIN — OXYCODONE AND ACETAMINOPHEN 1 TABLET(S): 5; 325 TABLET ORAL at 02:14

## 2021-04-10 RX ADMIN — SODIUM CHLORIDE 125 MILLILITER(S): 9 INJECTION, SOLUTION INTRAVENOUS at 03:28

## 2021-04-10 RX ADMIN — Medication 500 MILLIGRAM(S): at 17:19

## 2021-04-10 RX ADMIN — OXYCODONE AND ACETAMINOPHEN 2 TABLET(S): 5; 325 TABLET ORAL at 09:30

## 2021-04-10 RX ADMIN — OXYCODONE AND ACETAMINOPHEN 1 TABLET(S): 5; 325 TABLET ORAL at 23:57

## 2021-04-10 RX ADMIN — HEPARIN SODIUM 5000 UNIT(S): 5000 INJECTION INTRAVENOUS; SUBCUTANEOUS at 21:16

## 2021-04-10 RX ADMIN — Medication 500 MILLIGRAM(S): at 05:28

## 2021-04-10 RX ADMIN — SODIUM CHLORIDE 125 MILLILITER(S): 9 INJECTION, SOLUTION INTRAVENOUS at 17:19

## 2021-04-10 RX ADMIN — LOSARTAN POTASSIUM 25 MILLIGRAM(S): 100 TABLET, FILM COATED ORAL at 05:28

## 2021-04-10 NOTE — PROGRESS NOTE ADULT - ASSESSMENT
65 year old male s/p open suprapubic prostatectomy 4/5 POD#5, s/p ex-lap MELCHOR fragment retrieval POD#1    -regular diet  -wean CBI  -analgesia as needed  -OOB/DVT prophylaxis  -incentive spirometry  -probable discharge home today with banuelos 65 year old male s/p open suprapubic prostatectomy 4/5 POD#5, s/p ex-lap MELCHOR fragment retrieval POD#1    -regular diet  -CBI clamped, monitor color  -analgesia as needed  -OOB/DVT prophylaxis  -incentive spirometry  -will have CT cystogram Monday and probable discharge then with home PT

## 2021-04-10 NOTE — PROGRESS NOTE ADULT - SUBJECTIVE AND OBJECTIVE BOX
UROLOGY DAILY PROGRESS NOTE:     Subjective: Patient seen and examined at bedside. No overnight events.       Objective:  Vital signs  T(F): , Max: 99.9 (04-10-21 @ 00:29)  HR: 75 (04-10-21 @ 05:25)  BP: 120/73 (04-10-21 @ 05:25)  SpO2: 96% (04-10-21 @ 05:25)  Wt(kg): --    I&O's Summary    08 Apr 2021 07:01  -  09 Apr 2021 07:00  --------------------------------------------------------  IN: 1450 mL / OUT: 10 mL / NET: 1440 mL    09 Apr 2021 07:01  -  10 Apr 2021 06:24  --------------------------------------------------------  IN: 3175 mL / OUT: 0 mL / NET: 3175 mL        Gen: NAD  Pulm: No respiratory distress, no subcostal retractions  CV: RRR, no JVD  Abd: Soft, NT, ND  :    Labs:  04-09  13.22 / 29.6  /x      04-09  x     / x     /0.85                           9.4    13.22 )-----------( 237      ( 09 Apr 2021 07:23 )             29.6     04-09    139  |  106  |  13  ----------------------------<  96  4.0   |  23  |  0.85    Ca    8.5      09 Apr 2021 07:19          Urine Cx: Culture - Urine (03.31.21 @ 17:27)    Specimen Source: .Urine    Culture Results:   No growth    < from: US Kidney and Bladder (04.06.21 @ 16:32) >  FINDINGS:    Right kidney: 11.9 cm. No renal mass, hydronephrosis or calculi.    Left kidney: 10.1 cm. No renal mass, hydronephrosis or calculi.    Urinary bladder: Madden catheter is in place. Limited evaluation secondary to overlying bandages. Visualized portions of the wall. Irregular, likely secondary to chronic bladder outlet obstruction.    IMPRESSION:    No hydronephrosis.    < end of copied text >     UROLOGY DAILY PROGRESS NOTE:     Subjective: Patient seen and examined at bedside. No overnight events. Complaining of some pain this morning, which is relieved by medications.      Objective:  Vital signs  T(F): , Max: 99.9 (04-10-21 @ 00:29)  HR: 75 (04-10-21 @ 05:25)  BP: 120/73 (04-10-21 @ 05:25)  SpO2: 96% (04-10-21 @ 05:25)  Wt(kg): --    I&O's Summary    08 Apr 2021 07:01  -  09 Apr 2021 07:00  --------------------------------------------------------  IN: 1450 mL / OUT: 10 mL / NET: 1440 mL    09 Apr 2021 07:01  -  10 Apr 2021 06:24  --------------------------------------------------------  IN: 3175 mL / OUT: 0 mL / NET: 3175 mL        Gen: NAD  Pulm: No respiratory distress, no subcostal retractions  CV: RRR, no JVD  Abd: Soft, NT, ND, midline incision with staples clean and dry, penrose drain in place and dressing changed  :  CBI clamped earlier this morning and color clear cherry    Labs:  04-09  13.22 / 29.6  /x      04-09  x     / x     /0.85                           9.4    13.22 )-----------( 237      ( 09 Apr 2021 07:23 )             29.6     04-09    139  |  106  |  13  ----------------------------<  96  4.0   |  23  |  0.85    Ca    8.5      09 Apr 2021 07:19          Urine Cx: Culture - Urine (03.31.21 @ 17:27)    Specimen Source: .Urine    Culture Results:   No growth    < from: US Kidney and Bladder (04.06.21 @ 16:32) >  FINDINGS:    Right kidney: 11.9 cm. No renal mass, hydronephrosis or calculi.    Left kidney: 10.1 cm. No renal mass, hydronephrosis or calculi.    Urinary bladder: Madden catheter is in place. Limited evaluation secondary to overlying bandages. Visualized portions of the wall. Irregular, likely secondary to chronic bladder outlet obstruction.    IMPRESSION:    No hydronephrosis.    < end of copied text >

## 2021-04-11 LAB
BLD GP AB SCN SERPL QL: NEGATIVE — SIGNIFICANT CHANGE UP
HCT VFR BLD CALC: 25.3 % — LOW (ref 39–50)
HGB BLD-MCNC: 8.1 G/DL — LOW (ref 13–17)
MCHC RBC-ENTMCNC: 29.8 PG — SIGNIFICANT CHANGE UP (ref 27–34)
MCHC RBC-ENTMCNC: 32 GM/DL — SIGNIFICANT CHANGE UP (ref 32–36)
MCV RBC AUTO: 93 FL — SIGNIFICANT CHANGE UP (ref 80–100)
NRBC # BLD: 0 /100 WBCS — SIGNIFICANT CHANGE UP (ref 0–0)
PLATELET # BLD AUTO: 257 K/UL — SIGNIFICANT CHANGE UP (ref 150–400)
RBC # BLD: 2.72 M/UL — LOW (ref 4.2–5.8)
RBC # FLD: 14 % — SIGNIFICANT CHANGE UP (ref 10.3–14.5)
RH IG SCN BLD-IMP: POSITIVE — SIGNIFICANT CHANGE UP
WBC # BLD: 12.61 K/UL — HIGH (ref 3.8–10.5)
WBC # FLD AUTO: 12.61 K/UL — HIGH (ref 3.8–10.5)

## 2021-04-11 RX ADMIN — OXYCODONE AND ACETAMINOPHEN 2 TABLET(S): 5; 325 TABLET ORAL at 11:19

## 2021-04-11 RX ADMIN — AMLODIPINE BESYLATE 5 MILLIGRAM(S): 2.5 TABLET ORAL at 05:16

## 2021-04-11 RX ADMIN — HEPARIN SODIUM 5000 UNIT(S): 5000 INJECTION INTRAVENOUS; SUBCUTANEOUS at 05:16

## 2021-04-11 RX ADMIN — Medication 500 MILLIGRAM(S): at 05:16

## 2021-04-11 RX ADMIN — OXYCODONE AND ACETAMINOPHEN 2 TABLET(S): 5; 325 TABLET ORAL at 18:27

## 2021-04-11 RX ADMIN — OXYCODONE AND ACETAMINOPHEN 1 TABLET(S): 5; 325 TABLET ORAL at 00:27

## 2021-04-11 RX ADMIN — OXYCODONE AND ACETAMINOPHEN 2 TABLET(S): 5; 325 TABLET ORAL at 10:49

## 2021-04-11 RX ADMIN — HEPARIN SODIUM 5000 UNIT(S): 5000 INJECTION INTRAVENOUS; SUBCUTANEOUS at 15:48

## 2021-04-11 RX ADMIN — OXYCODONE AND ACETAMINOPHEN 2 TABLET(S): 5; 325 TABLET ORAL at 17:57

## 2021-04-11 RX ADMIN — HEPARIN SODIUM 5000 UNIT(S): 5000 INJECTION INTRAVENOUS; SUBCUTANEOUS at 22:58

## 2021-04-11 RX ADMIN — Medication 500 MILLIGRAM(S): at 17:58

## 2021-04-11 NOTE — PROGRESS NOTE ADULT - SUBJECTIVE AND OBJECTIVE BOX
UROLOGY DAILY PROGRESS NOTE:     Subjective: Patient seen and examined at bedside. No overnight events. CBI clamped and remained clear.     Objective:  Vital signs  T(F): , Max: 99.3 (04-11-21 @ 04:35)  HR: 75 (04-11-21 @ 09:27)  BP: 105/57 (04-11-21 @ 09:27)  SpO2: 95% (04-11-21 @ 09:27)  Wt(kg): --    I&O's Summary    10 Apr 2021 07:01  -  11 Apr 2021 07:00  --------------------------------------------------------  IN: 1670 mL / OUT: 4550 mL / NET: -2880 mL    11 Apr 2021 07:01  -  11 Apr 2021 09:38  --------------------------------------------------------  IN: 0 mL / OUT: 500 mL / NET: -500 mL    Gen: NAD  Pulm: No respiratory distress, no subcostal retractions  CV: RRR, no JVD  Abd: Soft, NT, ND  : Madden draining clear pink urine     Labs:  04-11  12.61 / 25.3  /x      04-10  14.94 / 24.8  /0.82                           8.1    12.61 )-----------( 257      ( 11 Apr 2021 07:11 )             25.3     04-10    140  |  106  |  12  ----------------------------<  100<H>  4.1   |  25  |  0.82    Ca    8.2<L>      10 Apr 2021 07:07

## 2021-04-11 NOTE — PROGRESS NOTE ADULT - ASSESSMENT
65 year old male s/p open suprapubic prostatectomy 4/5 POD#6, s/p ex-lap MELCHOR fragment retrieval POD#2    -Monitor color  -analgesia as needed  -OOB/DVT prophylaxis  -incentive spirometry  -CT cystogram tomorrow   -Possible discharge tomorrow with home PT

## 2021-04-12 LAB
ANION GAP SERPL CALC-SCNC: 11 MMOL/L — SIGNIFICANT CHANGE UP (ref 5–17)
BUN SERPL-MCNC: 13 MG/DL — SIGNIFICANT CHANGE UP (ref 7–23)
CALCIUM SERPL-MCNC: 8.8 MG/DL — SIGNIFICANT CHANGE UP (ref 8.4–10.5)
CHLORIDE SERPL-SCNC: 102 MMOL/L — SIGNIFICANT CHANGE UP (ref 96–108)
CO2 SERPL-SCNC: 27 MMOL/L — SIGNIFICANT CHANGE UP (ref 22–31)
CREAT SERPL-MCNC: 0.86 MG/DL — SIGNIFICANT CHANGE UP (ref 0.5–1.3)
GLUCOSE SERPL-MCNC: 99 MG/DL — SIGNIFICANT CHANGE UP (ref 70–99)
HCT VFR BLD CALC: 26.2 % — LOW (ref 39–50)
HGB BLD-MCNC: 8.5 G/DL — LOW (ref 13–17)
MCHC RBC-ENTMCNC: 29.2 PG — SIGNIFICANT CHANGE UP (ref 27–34)
MCHC RBC-ENTMCNC: 32.4 GM/DL — SIGNIFICANT CHANGE UP (ref 32–36)
MCV RBC AUTO: 90 FL — SIGNIFICANT CHANGE UP (ref 80–100)
NRBC # BLD: 0 /100 WBCS — SIGNIFICANT CHANGE UP (ref 0–0)
PLATELET # BLD AUTO: 384 K/UL — SIGNIFICANT CHANGE UP (ref 150–400)
POTASSIUM SERPL-MCNC: 4 MMOL/L — SIGNIFICANT CHANGE UP (ref 3.5–5.3)
POTASSIUM SERPL-SCNC: 4 MMOL/L — SIGNIFICANT CHANGE UP (ref 3.5–5.3)
RBC # BLD: 2.91 M/UL — LOW (ref 4.2–5.8)
RBC # FLD: 14.2 % — SIGNIFICANT CHANGE UP (ref 10.3–14.5)
SODIUM SERPL-SCNC: 140 MMOL/L — SIGNIFICANT CHANGE UP (ref 135–145)
WBC # BLD: 13.85 K/UL — HIGH (ref 3.8–10.5)
WBC # FLD AUTO: 13.85 K/UL — HIGH (ref 3.8–10.5)

## 2021-04-12 PROCEDURE — 72192 CT PELVIS W/O DYE: CPT | Mod: 26

## 2021-04-12 RX ORDER — SODIUM CHLORIDE 9 MG/ML
1000 INJECTION INTRAMUSCULAR; INTRAVENOUS; SUBCUTANEOUS ONCE
Refills: 0 | Status: COMPLETED | OUTPATIENT
Start: 2021-04-12 | End: 2021-04-12

## 2021-04-12 RX ADMIN — OXYCODONE AND ACETAMINOPHEN 2 TABLET(S): 5; 325 TABLET ORAL at 10:13

## 2021-04-12 RX ADMIN — HEPARIN SODIUM 5000 UNIT(S): 5000 INJECTION INTRAVENOUS; SUBCUTANEOUS at 22:34

## 2021-04-12 RX ADMIN — Medication 500 MILLIGRAM(S): at 17:26

## 2021-04-12 RX ADMIN — HEPARIN SODIUM 5000 UNIT(S): 5000 INJECTION INTRAVENOUS; SUBCUTANEOUS at 14:41

## 2021-04-12 RX ADMIN — HEPARIN SODIUM 5000 UNIT(S): 5000 INJECTION INTRAVENOUS; SUBCUTANEOUS at 06:21

## 2021-04-12 RX ADMIN — OXYCODONE AND ACETAMINOPHEN 2 TABLET(S): 5; 325 TABLET ORAL at 00:50

## 2021-04-12 RX ADMIN — OXYCODONE AND ACETAMINOPHEN 2 TABLET(S): 5; 325 TABLET ORAL at 00:20

## 2021-04-12 RX ADMIN — AMLODIPINE BESYLATE 5 MILLIGRAM(S): 2.5 TABLET ORAL at 06:21

## 2021-04-12 RX ADMIN — SODIUM CHLORIDE 1000 MILLILITER(S): 9 INJECTION INTRAMUSCULAR; INTRAVENOUS; SUBCUTANEOUS at 17:26

## 2021-04-12 RX ADMIN — LOSARTAN POTASSIUM 25 MILLIGRAM(S): 100 TABLET, FILM COATED ORAL at 06:21

## 2021-04-12 RX ADMIN — OXYCODONE AND ACETAMINOPHEN 2 TABLET(S): 5; 325 TABLET ORAL at 09:43

## 2021-04-12 RX ADMIN — Medication 500 MILLIGRAM(S): at 06:21

## 2021-04-12 NOTE — DIETITIAN INITIAL EVALUATION ADULT. - CHIEF COMPLAINT
This is a 65 year old male "s/p open suprapubic prostatectomy 4/5 POD#7, s/p ex-lap MELCHOR fragment retrieval POD#3" Discharge planning.

## 2021-04-12 NOTE — PROGRESS NOTE ADULT - SUBJECTIVE AND OBJECTIVE BOX
UROLOGY DAILY PROGRESS NOTE:     Subjective: Patient seen and examined at bedside. No acute events overnight       Objective:  Vital signs  T(F): , Max: 99 (04-11-21 @ 09:27)  HR: 77 (04-12-21 @ 06:11)  BP: 124/79 (04-12-21 @ 06:11)  SpO2: 96% (04-12-21 @ 06:11)  Wt(kg): --    Output     I&O's Detail    11 Apr 2021 07:01  -  12 Apr 2021 07:00  --------------------------------------------------------  IN:    Oral Fluid: 850 mL  Total IN: 850 mL    OUT:    Indwelling Catheter - Urethral (mL): 4350 mL  Total OUT: 4350 mL    Total NET: -3500 mL          Physical Exam:  Gen: NAD  Abd: soft nondistended staples C/D/I incisional tenderness   Back: no CVAT   : banuelos light pink    Labs:  04-12  13.85 / 26.2  /x      04-12  x     / x     /0.86                           8.5    13.85 )-----------( x        ( 12 Apr 2021 07:21 )             26.2     04-12    140  |  102  |  13  ----------------------------<  99  4.0   |  27  |  0.86    Ca    8.8      12 Apr 2021 07:19    LABS/RADIOLOGY RESULTS:                          8.5    13.85 )-----------( x        ( 12 Apr 2021 07:21 )             26.2   04-12    140  |  102  |  13  ----------------------------<  99  4.0   |  27  |  0.86    Ca    8.8      12 Apr 2021 07:19    Blood Cultures                Urine Cx:

## 2021-04-12 NOTE — DIETITIAN INITIAL EVALUATION ADULT. - ORAL INTAKE PTA/DIET HISTORY
Pt reports good PO intake and appetite PTA, consume regular heart healthy diet per report. Likes to cook, limits sodium intake. Confirms NKFA. Pt denies chewing/swallowing difficulty, nausea, vomiting, diarrhea, constipation. Takes vitamin C and vitamin D supplements

## 2021-04-12 NOTE — PROGRESS NOTE ADULT - ASSESSMENT
65 year old male s/p open suprapubic prostatectomy 4/5 POD#7, s/p ex-lap MELCHOR fragment retrieval POD#3    -Monitor color  -analgesia as needed  -OOB/DVT prophylaxis  -incentive spirometry  -CT cystogram this AM  possible TOV   -Possible discharge today with home PT

## 2021-04-12 NOTE — DIETITIAN INITIAL EVALUATION ADULT. - OTHER INFO
Weight: Pt reports he weighs himself regularly at home, states weight in the 180's which is consistent with current dosing weight of 188lbs.     Since admission pt reports good PO intake, consuming > 75% of estimated needs. Denies any acute GI distress. last BM 4/10. Encourage PO intake of protein-rich foods for healing.  Pt declines need for verbal or written diet education regarding DASH diet at this time. Patient with no nutrition-related questions at this time. Made aware RD remains available as needed.

## 2021-04-13 VITALS
HEART RATE: 69 BPM | OXYGEN SATURATION: 99 % | RESPIRATION RATE: 18 BRPM | DIASTOLIC BLOOD PRESSURE: 68 MMHG | SYSTOLIC BLOOD PRESSURE: 116 MMHG | TEMPERATURE: 98 F

## 2021-04-13 LAB — SURGICAL PATHOLOGY STUDY: SIGNIFICANT CHANGE UP

## 2021-04-13 PROCEDURE — 86923 COMPATIBILITY TEST ELECTRIC: CPT

## 2021-04-13 PROCEDURE — 72192 CT PELVIS W/O DYE: CPT

## 2021-04-13 PROCEDURE — 99238 HOSP IP/OBS DSCHRG MGMT 30/<: CPT

## 2021-04-13 PROCEDURE — 97161 PT EVAL LOW COMPLEX 20 MIN: CPT

## 2021-04-13 PROCEDURE — P9016: CPT

## 2021-04-13 PROCEDURE — 85027 COMPLETE CBC AUTOMATED: CPT

## 2021-04-13 PROCEDURE — 76770 US EXAM ABDO BACK WALL COMP: CPT

## 2021-04-13 PROCEDURE — 85025 COMPLETE CBC W/AUTO DIFF WBC: CPT

## 2021-04-13 PROCEDURE — 86901 BLOOD TYPING SEROLOGIC RH(D): CPT

## 2021-04-13 PROCEDURE — 86850 RBC ANTIBODY SCREEN: CPT

## 2021-04-13 PROCEDURE — 88342 IMHCHEM/IMCYTCHM 1ST ANTB: CPT

## 2021-04-13 PROCEDURE — 97116 GAIT TRAINING THERAPY: CPT

## 2021-04-13 PROCEDURE — C9399: CPT

## 2021-04-13 PROCEDURE — 87635 SARS-COV-2 COVID-19 AMP PRB: CPT

## 2021-04-13 PROCEDURE — 88300 SURGICAL PATH GROSS: CPT

## 2021-04-13 PROCEDURE — 86900 BLOOD TYPING SEROLOGIC ABO: CPT

## 2021-04-13 PROCEDURE — 97112 NEUROMUSCULAR REEDUCATION: CPT

## 2021-04-13 PROCEDURE — 88341 IMHCHEM/IMCYTCHM EA ADD ANTB: CPT

## 2021-04-13 PROCEDURE — 86769 SARS-COV-2 COVID-19 ANTIBODY: CPT

## 2021-04-13 PROCEDURE — 36430 TRANSFUSION BLD/BLD COMPNT: CPT

## 2021-04-13 PROCEDURE — 80048 BASIC METABOLIC PNL TOTAL CA: CPT

## 2021-04-13 PROCEDURE — 97530 THERAPEUTIC ACTIVITIES: CPT

## 2021-04-13 PROCEDURE — 88307 TISSUE EXAM BY PATHOLOGIST: CPT

## 2021-04-13 RX ORDER — DOCUSATE SODIUM 100 MG
1 CAPSULE ORAL
Qty: 60 | Refills: 0
Start: 2021-04-13 | End: 2021-05-12

## 2021-04-13 RX ORDER — CIPROFLOXACIN LACTATE 400MG/40ML
1 VIAL (ML) INTRAVENOUS
Qty: 4 | Refills: 0
Start: 2021-04-13 | End: 2021-04-14

## 2021-04-13 RX ORDER — FERROUS SULFATE 325(65) MG
1 TABLET ORAL
Qty: 30 | Refills: 0
Start: 2021-04-13

## 2021-04-13 RX ORDER — FINASTERIDE 5 MG/1
1 TABLET, FILM COATED ORAL
Qty: 30 | Refills: 0
Start: 2021-04-13 | End: 2021-05-12

## 2021-04-13 RX ADMIN — HEPARIN SODIUM 5000 UNIT(S): 5000 INJECTION INTRAVENOUS; SUBCUTANEOUS at 05:53

## 2021-04-13 RX ADMIN — OXYCODONE AND ACETAMINOPHEN 2 TABLET(S): 5; 325 TABLET ORAL at 03:31

## 2021-04-13 RX ADMIN — Medication 500 MILLIGRAM(S): at 05:54

## 2021-04-13 RX ADMIN — OXYCODONE AND ACETAMINOPHEN 2 TABLET(S): 5; 325 TABLET ORAL at 11:52

## 2021-04-13 RX ADMIN — OXYCODONE AND ACETAMINOPHEN 2 TABLET(S): 5; 325 TABLET ORAL at 12:22

## 2021-04-13 NOTE — PROGRESS NOTE ADULT - SUBJECTIVE AND OBJECTIVE BOX
UROLOGY DAILY PROGRESS NOTE:     Subjective: Patient seen and examined at bedside.   No acute events overnight hypotension resolved BP meds held   CT cystogram no leak banuelos removed with +TOV     Objective:  Vital signs  T(F): , Max: 98.7 (04-13-21 @ 01:14)  HR: 68 (04-13-21 @ 05:58)  BP: 111/66 (04-13-21 @ 05:58)  SpO2: 96% (04-13-21 @ 05:58)  Wt(kg): --    Output     I&O's Detail    12 Apr 2021 07:01  -  13 Apr 2021 07:00  --------------------------------------------------------  IN:    Oral Fluid: 340 mL    Sodium Chloride 0.9% Bolus: 1000 mL  Total IN: 1340 mL    OUT:    Indwelling Catheter - Urethral (mL): 2350 mL    Voided (mL): 2800 mL  Total OUT: 5150 mL    Total NET: -3810 mL      13 Apr 2021 07:01  -  13 Apr 2021 08:20  --------------------------------------------------------  IN:  Total IN: 0 mL    OUT:    Voided (mL): 700 mL  Total OUT: 700 mL    Total NET: -700 mL          Physical Exam:  Gen: NAD  Abd: soft incisional tenderness staples C/D/I   Back: no CVAT  : voiding     Labs:  04-12  13.85 / 26.2  /x      04-12  x     / x     /0.86                           8.5    13.85 )-----------( 384      ( 12 Apr 2021 07:21 )             26.2     04-12    140  |  102  |  13  ----------------------------<  99  4.0   |  27  |  0.86    Ca    8.8      12 Apr 2021 07:19    LABS/RADIOLOGY RESULTS:                          8.5    13.85 )-----------( 384      ( 12 Apr 2021 07:21 )             26.2   04-12    140  |  102  |  13  ----------------------------<  99  4.0   |  27  |  0.86    Ca    8.8      12 Apr 2021 07:19    Blood Cultures                Urine Cx:

## 2021-04-13 NOTE — PROGRESS NOTE ADULT - ASSESSMENT
65 year old male s/p open suprapubic prostatectomy 4/5 POD#8, s/p ex-lap MELCHOR fragment retrieval POD#4  voiding  -PVR elevated will recheck     -analgesia as needed  -OOB/DVT prophylaxis  -incentive spirometry  -CT cystogram no leak +TOV   -Possible discharge today with home PT  -continue to hold BP meds fu PMD  -on dc FeSo4, finasteride, cipro  f/u 4/19 with Dr. Muhammad staple removal

## 2021-04-13 NOTE — PROGRESS NOTE ADULT - ATTENDING COMMENTS
Patient seen and examined. Agree with assessment and plan.   Followup 4/19 for staple removal.
Patient seen and examined. Agree with assessment and plan.   Patient with good pain control  Denies flatus  Dressing serosanguinous from Penrose  Urine dark pink with CBI  Continue CBI  Continue IVF  Await return of bowel function  Drain output appropriate  Labs reviewed  Repeat BMP/CBC in pm.  Change Penrose dressing
Patient seen and examined. Agree with assessment and plan.  While preparing patient for discharge and removing the MELCHOR drain, the MELCHOR broke with minimal traction.  The remaining MELCHOR drain retracted in the wound. The was probed with evidence for residual MELCHOR drain.  I advise the patient of these findings.  I recommended patient return to the operating room for exploratory laparotomy to remove the residual foreign body. Informed consent was obtained risks and benefits were discussed. Alternative were given.  NPO  Return to OR for removal of retained drain.  I also spoke with his wife regarding these findings.
Patient seen and examined. Agree with assessment and plan.  stable  wound d/c/i  continue CBI for mild hematuria  OOB
Patient seen and examined. Agree with assessment and plan.  wound d/c/ii  DC Penrose drain  CT cystogram  Possible TOV  will need POA Thursday for staple removal.
Patient seen and examined. Agree with assessment and plan.  wound d/c/i  patient reports BM  urine pink  wean CBI  OOB

## 2021-04-19 ENCOUNTER — APPOINTMENT (OUTPATIENT)
Dept: UROLOGY | Facility: CLINIC | Age: 66
End: 2021-04-19
Payer: COMMERCIAL

## 2021-04-19 DIAGNOSIS — R33.9 RETENTION OF URINE, UNSPECIFIED: ICD-10-CM

## 2021-04-19 DIAGNOSIS — R97.20 ELEVATED PROSTATE, SPECIFIC ANTIGEN [PSA]: ICD-10-CM

## 2021-04-19 DIAGNOSIS — N39.3 STRESS INCONTINENCE (FEMALE) (MALE): ICD-10-CM

## 2021-04-19 PROCEDURE — 99024 POSTOP FOLLOW-UP VISIT: CPT

## 2021-04-19 RX ORDER — CIPROFLOXACIN HYDROCHLORIDE 500 MG/1
500 TABLET, FILM COATED ORAL
Qty: 14 | Refills: 1 | Status: ACTIVE | COMMUNITY
Start: 2021-04-19 | End: 1900-01-01

## 2021-04-19 NOTE — LETTER BODY
[FreeTextEntry1] : Gavin Ayala MD\par [address] [facility],\[place] [address]\par [phone]\par \par Dear Dr. Ayala,\par \par Reason for Visit: Follow-up after prostatectomy. BPH. Nocturnal enuresis. Stress incontinence. Urinary retention. Elevated PSA.\par \par This is a 65 year-old gentleman with elevated PSA, stress incontinence, urinary retention, and BPH. His renal ultrasound in January 2020 demonstrated a simple left renal cyst, but was otherwise unremarkable. Patient's cystoscopy in February 2020 demonstrated a 6 cm prostatic urethra and trilobar hypertrophy obstructing the urinary outlet. Patient's prostate MRI in February demonstrated an enlarged prostate and an intermediate prostatic lesion, PI-RADS 3. He returns today for staple removal. Since he was last seen, the patient underwent open simple suprapubic prostatectomy on April 5, which was complicated by a return to OR on April 9 for retained MELCHOR stent/drain. Patient reports taking Flomax BID and Proscar regularly without any side effects or difficulties. He reports of stable urinary symptoms on medical therapy. He denies any hematuria or pain. He denies any other changes in health. All other review of systems are negative. Past medical history, family history and social history were unchanged. Medications and allergies were reviewed. He has no known allergies to medication. \par \par On examination, the patient is a healthy-appearing gentleman in no acute distress. He is alert and oriented follows commands. He has normal mood and affect. He is normocephalic. Neck is supple. Oral no thrush Respirations are unlabored. His abdomen is soft and nontender. Bladder is nonpalpable. No CVA tenderness. Neurologically he is grossly intact. No peripheral edema. Skin without gross abnormality. He has normal male external genitalia. Normal meatus. Bilateral testes are descended intrascrotally and normal to palpation. On rectal examination, there is normal sphincter tone. The prostate is clinically benign without focal induration or nodularity.\par \par I removed the patient's staples today without difficulty. On examination, his incision demonstrated evidence of superficial cellulitis. The lower portion of the wound was probed and cultured today.\par \par ASSESSMENT: Follow-up after prostatectomy. BPH, stable on Flomax BID and Proscar. Nocturnal enuresis. Stress incontinence. Urinary retention, improved. Elevated PSA. Abnormal bladder scan. Superficial cellulitis.\par \par I counseled the patient. In terms of his superficial cellulitis, I cultured the wound today. I recommended he begin a course of Ciprofloxacin. I discussed the potential side effects of the medication. I counseled the patient on its use and side effects. If the patient develops any side effects, the patient will discontinue the medication and contact me. He will follow-up on Thursday for a wound check. Risks and alternatives were discussed. I answered the patient questions. The patient will follow-up as directed and will contact me with any questions or concerns. Thank you for the opportunity to participate in the care of Mr. LENZ. I will keep you updated on his progress.\par \par Plan: Culture. Course of Ciprofloxacin. Follow-up on Thursday.

## 2021-04-22 ENCOUNTER — APPOINTMENT (OUTPATIENT)
Dept: UROLOGY | Facility: CLINIC | Age: 66
End: 2021-04-22
Payer: COMMERCIAL

## 2021-04-22 VITALS
HEART RATE: 98 BPM | SYSTOLIC BLOOD PRESSURE: 152 MMHG | RESPIRATION RATE: 16 BRPM | DIASTOLIC BLOOD PRESSURE: 98 MMHG | TEMPERATURE: 97.5 F

## 2021-04-22 PROCEDURE — 99024 POSTOP FOLLOW-UP VISIT: CPT

## 2021-04-22 NOTE — LETTER BODY
[FreeTextEntry1] : Gavin Ayala MD\par [address] [facility],\[place] [address]\par [phone]\par \par Dear Dr. Ayala,\par \par Reason for Visit: Follow-up after prostatectomy. BPH. Nocturnal enuresis. Stress incontinence. Urinary retention. Elevated PSA. Superficial cellulitis.\par \par This is a 65 year-old gentleman with elevated PSA, stress incontinence, urinary retention, and BPH. His renal ultrasound in January 2020 demonstrated a simple left renal cyst, but was otherwise unremarkable. Patient's cystoscopy in February 2020 demonstrated a 6 cm prostatic urethra and trilobar hypertrophy obstructing the urinary outlet. Patient's prostate MRI in February demonstrated an enlarged prostate and an intermediate prostatic lesion, PI-RADS 3. The patient underwent open simple suprapubic prostatectomy on April 5, which was complicated by a return to OR on April 9 for retained MELCHOR stent/drain. He returns today for follow-up wound check and staples removal. He is accompanied today by his wife. Since his last visit, the patient obtained a positive wound culture. He reports taking Ciprofloxacin as directed. Patient reports taking Flomax BID and Proscar regularly without any side effects or difficulties. He reports of stable urinary symptoms on medical therapy. He denies any hematuria or pain. He denies any other changes in health. All other review of systems are negative. Past medical history, family history and social history were unchanged. Medications and allergies were reviewed. He has no known allergies to medication. \par \par On examination, the patient is a healthy-appearing gentleman in no acute distress. He is alert and oriented follows commands. He has normal mood and affect. He is normocephalic. Neck is supple. Oral no thrush Respirations are unlabored. His abdomen is soft and nontender. Bladder is nonpalpable. No CVA tenderness. Neurologically he is grossly intact. No peripheral edema. Skin without gross abnormality. He has normal male external genitalia. Normal meatus. Bilateral testes are descended intrascrotally and normal to palpation. On rectal examination, there is normal sphincter tone. The prostate is clinically benign without focal induration or nodularity.\par \par His wound culture was positive for moderate Corynebacterium species.\par \par On examination, his incision demonstrated evidence of seroma. I removed the staples and drained the inferior portion of the wound without difficulty today.\par \par ASSESSMENT: Follow-up after prostatectomy. BPH, stable on Flomax BID and Proscar. Nocturnal enuresis. Stress incontinence. Urinary retention. Elevated PSA. Superficial cellulitis, positive for moderate Corynebacterium species. Seroma.\par \par I counseled the patient. In terms of his wound, I instructed the patient and his wife, who is a nurse, on wound packing. I recommended they perform wound packing changes twice daily. I encouraged the patient to complete his course of Ciprofloxacin as directed. Risks and alternatives were discussed. I answered the patient questions. The patient will follow-up as directed and will contact me with any questions or concerns. Thank you for the opportunity to participate in the care of Mr. LENZ. I will keep you updated on his progress.\par \par Plan: Complete course of Ciprofloxacin. Wound packing changes twice daily. Follow-up as directed.

## 2021-04-24 LAB — BACTERIA FLD CULT: ABNORMAL

## 2021-04-26 ENCOUNTER — APPOINTMENT (OUTPATIENT)
Dept: UROLOGY | Facility: CLINIC | Age: 66
End: 2021-04-26
Payer: COMMERCIAL

## 2021-04-26 PROCEDURE — 99024 POSTOP FOLLOW-UP VISIT: CPT

## 2021-04-26 NOTE — LETTER BODY
[FreeTextEntry1] : Gavin Ayala MD\par [address] [facility],\[place] [address]\par [phone]\par \par Dear Dr. Ayala,\par \par Reason for Visit: Follow-up after prostatectomy. BPH. Nocturnal enuresis. Stress incontinence. Urinary retention. Elevated PSA. Superficial cellulitis.\par \par This is a 65 year-old gentleman with elevated PSA, stress incontinence, urinary retention, and BPH. His renal ultrasound in January 2020 demonstrated a simple left renal cyst, but was otherwise unremarkable. Patient's cystoscopy in February 2020 demonstrated a 6 cm prostatic urethra and trilobar hypertrophy obstructing the urinary outlet. Patient's prostate MRI in February demonstrated an enlarged prostate and an intermediate prostatic lesion, PI-RADS 3. The patient underwent open simple suprapubic prostatectomy on April 5, which was complicated by a return to OR on April 9 for retained MELCHOR stent/drain. His wound culture was positive for Corynebacterium species. The patient returns today for follow-up wound check and staples removal. Since he was last seen, the patient reports changing his wound packing regularly as directed. He continues to take Flomax BID and Proscar regularly without any side effects or difficulties. He reports of stable urinary symptoms on medical therapy. He denies any hematuria or pain. He denies any changes in health. All other review of systems are negative. Past medical history, family history and social history were unchanged. Medications and allergies were reviewed. He has no known allergies to medication. \par \par On examination, the patient is a healthy-appearing gentleman in no acute distress. He is alert and oriented follows commands. He has normal mood and affect. He is normocephalic. Neck is supple. Oral no thrush Respirations are unlabored. His abdomen is soft and nontender. Bladder is nonpalpable. No CVA tenderness. Neurologically he is grossly intact. No peripheral edema. Skin without gross abnormality. He has normal male external genitalia. Normal meatus. Bilateral testes are descended intrascrotally and normal to palpation. \par \par On examination, his wound has improved. I removed staples from the wound today without difficulty. I also replaced the wound packing. The wound is granulating well. \par \par ASSESSMENT: Follow-up after prostatectomy. BPH, stable on Flomax BID and Proscar. Nocturnal enuresis. Stress incontinence. Urinary retention, resolved. Superficial cellulitis, positive for moderate Corynebacterium species. Seroma.\par \par I counseled the patient. His wound has improved. I recommended the patient continue performing wound packing changes twice daily. Risks and alternatives were discussed. I answered the patient questions. The patient will follow-up in 2 weeks and will contact me with any questions or concerns. Thank you for the opportunity to participate in the care of Mr. LENZ. I will keep you updated on his progress.\par \par Plan: Continue wound packing changes twice daily. Follow-up in 2 weeks.

## 2021-05-10 ENCOUNTER — APPOINTMENT (OUTPATIENT)
Dept: UROLOGY | Facility: CLINIC | Age: 66
End: 2021-05-10
Payer: COMMERCIAL

## 2021-05-10 DIAGNOSIS — N40.1 BENIGN PROSTATIC HYPERPLASIA WITH LOWER URINARY TRACT SYMPMS: ICD-10-CM

## 2021-05-10 DIAGNOSIS — N13.8 BENIGN PROSTATIC HYPERPLASIA WITH LOWER URINARY TRACT SYMPMS: ICD-10-CM

## 2021-05-10 PROCEDURE — 99024 POSTOP FOLLOW-UP VISIT: CPT

## 2021-05-10 NOTE — LETTER BODY
[FreeTextEntry1] : Gavin Ayala MD\par [address] [facility],\[place] [address]\par [phone]\par \par Dear Dr. Ayala,\par \par Reason for Visit: Follow-up after prostatectomy. BPH. Nocturnal enuresis. Stress incontinence. Urinary retention. Elevated PSA. Superficial cellulitis.\par \par This is a 65 year-old gentleman with elevated PSA, stress incontinence, urinary retention, and BPH. His renal ultrasound in Jan 2020 was unremarkable. Patient's cystoscopy in Feb 2020 demonstrated a 6 cm prostatic urethra and trilobar hypertrophy obstructing the urinary outlet. Patient's prostate MRI in Feb demonstrated an enlarged prostate and an intermediate prostatic lesion, PI-RADS 3. He underwent open simple suprapubic prostatectomy on April 5, which was complicated by a return to OR on April 9 for retained MELCHOR stent/drain. His wound culture was positive for Corynebacterium species. He returns today for follow-up wound check. Since his last visit, the patient continues to perform wound packing changes regularly as directed. The patient continues to take Flomax BID and Proscar regularly without any side effects or difficulties. He reports of stable urinary symptoms on medical therapy. He denies any hematuria or pain. He denies any changes in health. All other review of systems are negative. Past medical history, family history and social history were unchanged. Medications and allergies were reviewed. He has no known allergies to medication. \par \par On examination, the patient is a healthy-appearing gentleman in no acute distress. He is alert and oriented follows commands. He has normal mood and affect. He is normocephalic. Neck is supple. Oral no thrush Respirations are unlabored. His abdomen is soft and nontender. Bladder is nonpalpable. No CVA tenderness. Neurologically he is grossly intact. No peripheral edema. Skin without gross abnormality. He has normal male external genitalia. Normal meatus. Bilateral testes are descended intrascrotally and normal to palpation. \par \par On examination, his wound is 90% improved. I replaced the wound packing today. The wound is granulating well.\par \par ASSESSMENT: Follow-up after prostatectomy. BPH, stable on Flomax BID and Proscar. Nocturnal enuresis. Stress incontinence. Urinary retention, resolved. Superficial cellulitis, positive for moderate Corynebacterium species. Seroma.\par \par I counseled the patient. He is doing well. His wound is 90% improved. I recommended the patient continue with dry-to-dry wound dressing changes. Patient understands that if he develops gross hematuria or any urinary discomfort, he will contact me for further evaluation. Risks and alternatives were discussed. I answered the patient questions. The patient will follow-up in 1 month and will contact me with any questions or concerns. Thank you for the opportunity to participate in the care of Mr. LENZ. I will keep you updated on his progress.\par \par Plan: Continue wound packing changes. Follow-up in 1 month.

## 2021-05-16 PROBLEM — N40.1 PROSTATIC HYPERPLASIA, BENIGN LOCALIZED, WITH OBSTRUCTION: Status: ACTIVE | Noted: 2021-01-04

## 2021-06-03 ENCOUNTER — APPOINTMENT (OUTPATIENT)
Dept: UROLOGY | Facility: CLINIC | Age: 66
End: 2021-06-03
Payer: COMMERCIAL

## 2021-06-03 PROCEDURE — 99024 POSTOP FOLLOW-UP VISIT: CPT

## 2021-06-03 NOTE — LETTER BODY
[FreeTextEntry1] : Gavin Ayala MD\par [address] [facility] [place],\[place] [address]\par [phone]\par \par Dear Dr. Ayala,\par \par Reason for Visit: BPH s/p prostatectomy. \par \par This is a 65 year-old gentleman with BPH s/p prostatectomy, elevated PSA, stress incontinence, and urinary retention.. Patient's prostate MRI in Feb demonstrated an enlarged prostate and an intermediate prostatic lesion, PI-RADS 3. He underwent open simple suprapubic prostatectomy on April 5, which was complicated by a return to OR on April 9 for retained MELCHOR stent/drain.During his last visit, the wound culture was positive for Corynebacterium species. He returns today for follow-up wound check. The patient continues to take Flomax BID and Proscar regularly without any side effects or difficulties. He reports of stable urinary symptoms on medical therapy. He denies any hematuria or pain. He denies any changes in health. All other review of systems are negative. Past medical history, family history and social history were unchanged. Medications and allergies were reviewed. He has no known allergies to medication. \par \par On examination, the patient is a healthy-appearing gentleman in no acute distress. He is alert and oriented follows commands. He has normal mood and affect. He is normocephalic. Neck is supple. Oral no thrush Respirations are unlabored. His abdomen is soft and nontender. Bladder is nonpalpable. No CVA tenderness. Neurologically he is grossly intact. No peripheral edema. Skin without gross abnormality. He has normal male external genitalia. Normal meatus. Bilateral testes are descended intrascrotally and normal to palpation. \par \par On examination, his wound is healed, no signs of infection. \par \par ASSESSMENT: BPH s/p prostatectomy. BPH  stable on Flomax BID and Proscar. . \par \par I counseled the patient. He is doing well. His wound is healed and there are no signs of infection. Patient understands that if he develops gross hematuria or any urinary discomfort, he will contact me for further evaluation. Risks and alternatives were discussed. I answered the patient questions. the patient will continue to take Proscar and Flomax as directed.The patient will follow-up in 6 months and will contact me with any questions or concerns. Thank you for the opportunity to participate in the care of Mr. LENZ. I will keep you updated on his progress.\par \par Plan: Continue Flomax and Proscar. Follow-up in 6 months. . 
Yes

## 2021-06-03 NOTE — ADDENDUM
[FreeTextEntry1] : Entered by Vania Martino, acting as scribe for Dr. Fernando Muhammad.\par \par The documentation recorded by the scribe accurately reflects the service I personally performed and the decisions made by me.

## 2021-11-16 NOTE — PHYSICAL THERAPY INITIAL EVALUATION ADULT - LIVES WITH, PROFILE
[facility]  [address] [place] [address]. [address]         Phone: [phone]       Fax: [phone]    Physical Therapy Lower Extremity Evaluation    Date:  2021  Patient: Shayna Sandra  : 1958  MRN: [id number]  Physician:  Franny Owens MD    Insurance: [facility]: [place]  # of visits allowed/remainin  Medical Diagnosis:  M47.817 (ICD-10-CM) - Lumbosacral spondylosis without myelopathy   Rehab Codes: M 54.41, M 54.42,   Onset date: 10/28/21   Next Dr's appt. : tbd  Visit Count:     Cancel/No Show: 0/0    Subjective:   CC: Patient is a pleasant 66 yo male reporting chronic low back pain. Pt states pain began 40+ years ago after falling out of a tree. Pain is located across low back with B radiculopathy to posterior knees. Pain worsens with long durations of walking/standing and increased activity. Pt notes recent diagnosis of diabetes with onset of B foot N/T. Pt denies N/T down either LE, and denies any falls in the last year. Unsteadiness on feet noted. HPI: (onset date) 10/28/21 referral date; Chronic low back pain began when pt was 18 yo after falling out of a tree; previous 3 rounds of physical therapy at current location with most recent therapy performed via aquatic. No previous lumbar surgery.      PMHx: [] Unremarkable [x] Diabetes [x] HTN  [] Pacemaker   [x] MI/Heart Problems [] Cancer [x] Arthritis [] Other:              [x] Refer to full medical chart  In EPIC     Comorbidities:   [x] Obesity [] Dialysis  [] Other:   [x] COPD [] Dementia [] Other:   [] Stroke [x] Sleep apnea - does not use CPAP d/t \"missing parts\"  [] Other:   [] Vascular disease [] Rheumatic disease [] Other:     Tests: [x] X-Ray: [] MRI:  [] Other:      2020 XR LUMBAR SPINE  FINDINGS:   The vertebral body heights are maintained.  The disc spaces are maintained. There is mild degenerative facet hypertrophy. Mary Sayres is no spondylolisthesis.    The visualized bony pelvis is intact.  The SI joints are maintained.  The   surrounding soft tissues are unremarkable. Medications: [x] Refer to full medical record [] None [] Other:  Allergies:      [x] Refer to full medical record [] None [] Other:    Pain location:  low back    Pain rating/description at rest:  4/10   Pain rating/description at best:  0/10 when sleeping   Improves with: percocet's but not on them, heating pad, back massage, daily NASAIDS, gabapentin     Pain rating/description at worst:    10/10  Worsens with: standing 3 hours at work, 30 min at home,    Sleep quality/quanity  wakes up d/t restroom   Sleeping position:          Function:  Hand Dominance  [x] Right  [] Left              Current level of function:     ADLs Independent in all except:   Dressing: difficulty with socks     Mobility: limited standing and walking tolerance  20 min standing, 10 min walking     Lifting/carrying:   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     Driving: indep     Grocery shopping:       Home Set up Unknown    Job Description [employer]   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  Drawer   []   Hip Flex   3+/5 4-/5 Post. Drawer   []   Ext     Lachmans   []   ER     Valgus Stress   []   IR     Varus Stress   []   ABD   3+/5* 4-/5 Longs   []   ADD     Apleys Comp.   []   Knee Flex   5/5 5/5 Apleys Dist.   []   Ext   5/5 5/5 Hip Scouring   []   Ankle DF   5/5 5/5 ROBBINs + + []   PF     Piriformis + + [] INV     Herberts + + []   EVER     Talor Tilt   []        Pat-Fem Grind   []     * = pt reports pain    Muscle length: mod L hamstring tightness, min R hamstring tightness  Mod B quad length     Lumbar ROM L R      Flexion  75%  Increase pain    Extension  25%   No pain     Side-bending  75% 75%     Rotation  50% 50%     Lawrence Tests ? Pain ? Pain No Change Not Tested   RFIS []  [x]  []  []    PAULIE [x]  []  []  []    RFIL []  []  []  [x]    REIL []  []  []  [x]        BALANCE Left  Right   Tandem Stance (Eyes Open) 5 1   Tandem Stance (Eyes Closed)     Single Leg (Eyes Open) 0 0   Single Leg (Eyes Closed     Other: NBOS EC  20 sec    Poor balance confidence and increased sway noted during assessment     OBSERVATION No Deficit Deficit Not Tested Comments   Posture       Forward Head [] [x] []    Rounded Shoulders [] [x] []    LE bony alignment  [] [x] [] B genu valgus    Leg Length Discrp [x] [] []    SLR [] [x] [] (+) L    Palpation [] [x] [] TTP to B paraspinals    Sensation [] [x] [] Pt reported decreased B sensation    Edema [] [] [x]    Neurological [] [] [x]    Patellar Mobility [] [] [x]    Patellar Orientation [] [] [x]    Step test 4\" [] [] [x]    Squat  [] [] [x]          Assessment: [x] Patient is a pleasant 66 yo male reporting chronic low back pain for 40+ years with B radiculopathy. Decreased functional tolerance reported d/t limited standing/walking tolerance, exacerbated by sedentary lifestyle. Impaired balance demonstrated with B LE ER noted for compensation. Upon lumbar ROM assessment, pt demonstrates extension biased. Pt would benefit from aquatic therapy to decrease stress to lumbar spine, improve mobility, and increase activity tolerance to minimize symptoms and improve LOF.         STG: (to be met in 6 treatments)  1. ? Pain: Pt will report decreased pain 6/10 or less to improve walking tolerance to 45 min or more   2. ? Strength: Pt will demonstrate improved B hip strength 4-/5 or higher to perform ADLs with decreased difficulty  3. ? Function: Pt will demonstrate safe lifting/carrying techniques to decrease risk of injury at work   4. Independent with Home Exercise Programs    LTG: (to be met in 12 treatments)  1. Pt will demonstrate lumbar ROM WNL to improve mobility  2. Pt will demonstrate improved B hip strength 4/5 or higher to improve walking/standing tolerance  3. Pt will demonstrate improved balance evident by B tandem stance 10 seconds or more to decrease risk of falls. Patient goals: to be able to move better without pain       Evaluation Complexity:  History (Personal factors, comorbidities) [] 0 [] 1-2 [x] 3+   Exam (limitations, restrictions) [] 1-2 [x] 3 [] 4+   Clinical presentation (progression) [x] Stable [] Evolving  [] Unstable   Decision Making [x] Low [] Moderate [] High    [x] Low Complexity [] Moderate Complexity [] High Complexity       Rehab Potential:  [x] Good  [] Fair  [] Poor   Suggested Professional Referral:  [x] No  [] Yes:  Barriers to Goal Achievement[de-identified]  [x] No  [] Yes:  Domestic Concerns:  [x] No  [] Yes:     Pt. Education:  [x] Plans/Goals, Risks/Benefits discussed  [x] Home exercise program    Method of Education: [x] Verbal  [x] Demo  [x] Written  Comprehension of Education:  [x] Verbalizes understanding. [x] Demonstrates understanding. [x] Needs Review. [] Demonstrates/verbalizes understanding of HEP/Ed previously given.     Treatment Plan:  [x] Therapeutic Exercise   03695  [] Iontophoresis: 4 mg/mL Dexamethasone Sodium Phosphate  mAmin  83400   [] Therapeutic Activity  25806 [] Vasopneumatic cold with compression  74819    [] Gait Training   67273 [] Ultrasound   09146   [] Neuromuscular Re-education  71735 [] Electrical Stimulation Unattended  43274   [x] Manual Therapy  99163 [] Electrical Stimulation Attended  89190   [x] Instruction in HEP  [] Lumbar/Cervical Traction  84053   [x] Aquatic Therapy   90757 [x] Cold/hotpack    [] Massage   84510      [] Dry Needling, 1 or 2 muscles  30040   [] Biofeedback, first 15 minutes   56960  [] Biofeedback, additional 15 minutes   11530 [] Dry Needling, 3 or more muscles  55324       Frequency:   2x/week for 12 visits      Todays Treatment:  Modalities:   Precautions:  Exercises: extension biased   Exercise Reps/ Time Weight/ Level Comments   Sitting        Figure 4 stretch x20      B hip abduction x20      Hamstring stretch 2x20\"               Standing marches x20   B UE support    Standing lumbar ext x10             education 5 min   Importance of therapy and HEP, aquatic handout review (tour denied d/t previous tx at facility)    Other: pt received written HEP with the above exercises 2/3     Special instructions for next session: AQUATICS DLS PHASE 1       Treatment Charges: Mins Units   [x] Evaluation       [x]  Low       []  Moderate       []  High 30 1   []  Modalities     [x]  Ther Exercise 15 1   []  Manual Therapy     []  Ther Activities     []  Aquatics     []  Neuromuscular     []  Gait Training     []  Dry Needling           1-2 muscles     []  Dry Needling           3 or more          muscles     [] Vasocompression     []  Other         TOTAL TREATMENT TIME: 45 min     Time in: 1:45pm      Time Out: 2:30pm    Electronically signed by: Dustin Lopez PT spouse

## 2025-01-21 NOTE — ASU PATIENT PROFILE, ADULT - MENTAL HEALTH CONDITIONS/SYMPTOMS, PROFILE
CC:  Caroline Rodríguez is here today for   Chief Complaint   Patient presents with    Office Visit     Follow up - right hand injury        PCP Marily Jimenez MD   Medications: medications verified and updated  denies known Latex allergy or symptoms of Latex sensitivity.  Tobacco history: verified    Preferred pharmacy verified:    [id number] - [place], [place] - [address]  [address]  [facility] [address]  Phone: [phone] Fax: [phone]      REVIEW OF SYSTEMS:  Gastrointestinal: none  Genitourinary:none  Nervous: none  Cardiac: none  Respiratory:none  Integumentary: none  Allergies: See allergy list  Hematologic:  none   none